# Patient Record
Sex: FEMALE | Race: WHITE | NOT HISPANIC OR LATINO | Employment: FULL TIME | URBAN - METROPOLITAN AREA
[De-identification: names, ages, dates, MRNs, and addresses within clinical notes are randomized per-mention and may not be internally consistent; named-entity substitution may affect disease eponyms.]

---

## 2020-01-31 ENCOUNTER — OFFICE VISIT (OUTPATIENT)
Dept: URGENT CARE | Facility: CLINIC | Age: 35
End: 2020-01-31
Payer: COMMERCIAL

## 2020-01-31 VITALS
HEART RATE: 69 BPM | DIASTOLIC BLOOD PRESSURE: 55 MMHG | RESPIRATION RATE: 18 BRPM | BODY MASS INDEX: 23.08 KG/M2 | TEMPERATURE: 97.4 F | SYSTOLIC BLOOD PRESSURE: 111 MMHG | OXYGEN SATURATION: 98 % | HEIGHT: 62 IN | WEIGHT: 125.4 LBS

## 2020-01-31 DIAGNOSIS — J06.9 ACUTE URI: Primary | ICD-10-CM

## 2020-01-31 PROCEDURE — G0383 LEV 4 HOSP TYPE B ED VISIT: HCPCS | Performed by: PHYSICIAN ASSISTANT

## 2020-01-31 NOTE — PROGRESS NOTES
Assessment/Plan    Acute URI [J06 9]  1  Acute URI           Subjective:     Patient ID: Azeb Cabral is a 37 y o  female  Reason For Visit / Chief Complaint  Chief Complaint   Patient presents with    Sore Throat     Symptoms began approximately a week ago    Cough         12-year-old female presents the clinic with nasal congestion, rhinorrhea, sore throat, cough that started approximately 1 week ago  Patient has not tried over-the-counter medications  Patient denies fevers, chills, nausea, vomiting, abdominal pain, chest tightness, wheezing, shortness of breath, headaches, lightheadedness, dizziness  History reviewed  No pertinent past medical history  History reviewed  No pertinent surgical history  History reviewed  No pertinent family history  Review of Systems   Constitutional: Negative for chills and fever  HENT: Positive for congestion, rhinorrhea and sore throat  Respiratory: Positive for cough  Gastrointestinal: Negative for abdominal pain, diarrhea, nausea and vomiting  Musculoskeletal: Negative for myalgias  Neurological: Negative for dizziness, light-headedness and headaches  Objective:    /55   Pulse 69   Temp (!) 97 4 °F (36 3 °C) (Tympanic)   Resp 18   Ht 5' 2" (1 575 m)   Wt 56 9 kg (125 lb 6 4 oz)   SpO2 98%   BMI 22 94 kg/m²     Physical Exam   Constitutional: She is oriented to person, place, and time  She appears well-developed and well-nourished  No distress  HENT:   Head: Normocephalic and atraumatic  Right Ear: Tympanic membrane normal    Left Ear: Tympanic membrane normal    Nose: Mucosal edema and rhinorrhea present  Right sinus exhibits no maxillary sinus tenderness and no frontal sinus tenderness  Left sinus exhibits no maxillary sinus tenderness and no frontal sinus tenderness  Mouth/Throat: Uvula is midline and mucous membranes are normal  Posterior oropharyngeal erythema (PND) present     Cardiovascular: Normal rate and regular rhythm  Pulmonary/Chest: Effort normal and breath sounds normal    Musculoskeletal: Normal range of motion  Neurological: She is alert and oriented to person, place, and time  Skin: Skin is warm and dry  No rash noted  She is not diaphoretic  Nursing note and vitals reviewed

## 2020-01-31 NOTE — PATIENT INSTRUCTIONS
Upper Respiratory Infection   AMBULATORY CARE:   An upper respiratory infection  is also called a common cold  It can affect your nose, throat, ears, and sinuses  Common signs and symptoms include the following:  Cold symptoms are usually worst for the first 3 to 5 days  You may have any of the following:  · Runny or stuffy nose    · Sneezing and coughing    · Sore throat or hoarseness    · Red, watery, and sore eyes    · Fatigue     · Chills and fever    · Headache, body aches, or sore muscles  Seek care immediately if:   · You have chest pain or trouble breathing  Contact your healthcare provider if:   · You have a fever over 102ºF (39°C)  · Your sore throat gets worse or you see white or yellow spots in your throat  · Your symptoms get worse after 3 to 5 days or your cold is not better in 14 days  · You have a rash anywhere on your skin  · You have large, tender lumps in your neck  · You have thick, green or yellow drainage from your nose  · You cough up thick yellow, green, or bloody mucus  · You have vomiting for more than 24 hours and cannot keep fluids down  · You have a bad earache  · You have questions or concerns about your condition or care  Treatment for a cold: There is no cure for the common cold  Colds are caused by viruses and do not get better with antibiotics  Most people get better in 7 to 14 days  You may continue to cough for 2 to 3 weeks  The following may help decrease your symptoms:  · Decongestants  help reduce nasal congestion and help you breathe more easily  If you take decongestant pills, they may make you feel restless or not able to sleep  Do not use decongestant sprays for more than a few days  · Cough suppressants  help reduce coughing  Ask your healthcare provider which type of cough medicine is best for you  · NSAIDs , such as ibuprofen, help decrease swelling, pain, and fever   NSAIDs can cause stomach bleeding or kidney problems in certain people  If you take blood thinner medicine, always ask your healthcare provider if NSAIDs are safe for you  Always read the medicine label and follow directions  · Acetaminophen  decreases pain and fever  It is available without a doctor's order  Ask how much to take and how often to take it  Follow directions  Read the labels of all other medicines you are using to see if they also contain acetaminophen, or ask your doctor or pharmacist  Acetaminophen can cause liver damage if not taken correctly  Do not use more than 4 grams (4,000 milligrams) total of acetaminophen in one day  Manage your cold:   · Rest as much as possible  Slowly start to do more each day  · Drink more liquids as directed  Liquids will help thin and loosen mucus so you can cough it up  Liquids will also help prevent dehydration  Liquids that help prevent dehydration include water, fruit juice, and broth  Do not drink liquids that contain caffeine  Caffeine can increase your risk for dehydration  Ask your healthcare provider how much liquid to drink each day  · Soothe a sore throat  Gargle with warm salt water  This helps your sore throat feel better  Make salt water by dissolving ¼ teaspoon salt in 1 cup warm water  You may also suck on hard candy or throat lozenges  You may use a sore throat spray  · Use a humidifier or vaporizer  Use a cool mist humidifier or a vaporizer to increase air moisture in your home  This may make it easier for you to breathe and help decrease your cough  · Use saline nasal drops as directed  These help relieve congestion  · Apply petroleum-based jelly around the outside of your nostrils  This can decrease irritation from blowing your nose  · Do not smoke  Nicotine and other chemicals in cigarettes and cigars can make your symptoms worse  They can also cause infections such as bronchitis or pneumonia   Ask your healthcare provider for information if you currently smoke and need help to quit  E-cigarettes or smokeless tobacco still contain nicotine  Talk to your healthcare provider before you use these products  Prevent spreading your cold to others:   · Try to stay away from other people during the first 2 to 3 days of your cold when it is more easily spread  · Do not share food or drinks  · Do not share hand towels with household members  · Wash your hands often, especially after you blow your nose  Turn away from other people and cover your mouth and nose with a tissue when you sneeze or cough  Follow up with your healthcare provider as directed:  Write down your questions so you remember to ask them during your visits  © 2017 [facility] [facility] Information is for End User's use only and may not be sold, redistributed or otherwise used for commercial purposes  All illustrations and images included in CareNotes® are the copyrighted property of [employer]  or Uval[employer]  The above information is an  only  It is not intended as medical advice for individual conditions or treatments  Talk to your doctor, nurse or pharmacist before following any medical regimen to see if it is safe and effective for you

## 2021-10-02 ENCOUNTER — HOSPITAL ENCOUNTER (EMERGENCY)
Facility: HOSPITAL | Age: 36
Discharge: HOME | End: 2021-10-02
Attending: EMERGENCY MEDICINE | Admitting: EMERGENCY MEDICINE
Payer: COMMERCIAL

## 2021-10-02 VITALS
WEIGHT: 126.9 LBS | HEART RATE: 69 BPM | RESPIRATION RATE: 16 BRPM | OXYGEN SATURATION: 100 % | TEMPERATURE: 97.6 F | DIASTOLIC BLOOD PRESSURE: 90 MMHG | SYSTOLIC BLOOD PRESSURE: 174 MMHG | BODY MASS INDEX: 23.21 KG/M2

## 2021-10-02 DIAGNOSIS — J06.9 VIRAL URI WITH COUGH: Primary | ICD-10-CM

## 2021-10-02 LAB — SARS-COV-2 RNA RESP QL NAA+PROBE: NEGATIVE

## 2021-10-02 PROCEDURE — U0005 INFEC AGEN DETEC AMPLI PROBE: HCPCS | Performed by: EMERGENCY MEDICINE

## 2021-10-02 PROCEDURE — 99282 EMERGENCY DEPT VISIT SF MDM: CPT | Performed by: EMERGENCY MEDICINE

## 2021-10-02 PROCEDURE — U0003 INFECTIOUS AGENT DETECTION BY NUCLEIC ACID (DNA OR RNA); SEVERE ACUTE RESPIRATORY SYNDROME CORONAVIRUS 2 (SARS-COV-2) (CORONAVIRUS DISEASE [COVID-19]), AMPLIFIED PROBE TECHNIQUE, MAKING USE OF HIGH THROUGHPUT TECHNOLOGIES AS DESCRIBED BY CMS-2020-01-R: HCPCS | Performed by: EMERGENCY MEDICINE

## 2021-10-02 PROCEDURE — 99283 EMERGENCY DEPT VISIT LOW MDM: CPT

## 2021-10-02 RX ORDER — FLUTICASONE PROPIONATE 50 MCG
1 SPRAY, SUSPENSION (ML) NASAL DAILY
Qty: 16 G | Refills: 0 | Status: SHIPPED | OUTPATIENT
Start: 2021-10-02

## 2021-10-02 RX ORDER — PSEUDOEPHEDRINE HYDROCHLORIDE 30 MG/1
30 TABLET ORAL EVERY 6 HOURS PRN
Qty: 20 TABLET | Refills: 0 | Status: SHIPPED | OUTPATIENT
Start: 2021-10-02

## 2021-10-03 ENCOUNTER — TELEPHONE (OUTPATIENT)
Dept: EMERGENCY DEPT | Facility: HOSPITAL | Age: 36
End: 2021-10-03

## 2022-10-01 ENCOUNTER — OFFICE VISIT (OUTPATIENT)
Dept: URGENT CARE | Facility: CLINIC | Age: 37
End: 2022-10-01
Payer: COMMERCIAL

## 2022-10-01 VITALS
BODY MASS INDEX: 24.1 KG/M2 | DIASTOLIC BLOOD PRESSURE: 72 MMHG | WEIGHT: 136 LBS | SYSTOLIC BLOOD PRESSURE: 119 MMHG | OXYGEN SATURATION: 99 % | TEMPERATURE: 99.1 F | HEIGHT: 63 IN | RESPIRATION RATE: 18 BRPM | HEART RATE: 63 BPM

## 2022-10-01 DIAGNOSIS — S05.02XA ABRASION OF LEFT CORNEA, INITIAL ENCOUNTER: Primary | ICD-10-CM

## 2022-10-01 PROCEDURE — G0382 LEV 3 HOSP TYPE B ED VISIT: HCPCS | Performed by: PREVENTIVE MEDICINE

## 2022-10-01 RX ORDER — BUSPIRONE HYDROCHLORIDE 5 MG/1
5 TABLET ORAL 2 TIMES DAILY
COMMUNITY
Start: 2022-09-04

## 2022-10-01 RX ORDER — SODIUM FLUORIDE 1.1 G/100G
GEL, DENTIFRICE ORAL
COMMUNITY
Start: 2022-08-02

## 2022-10-01 RX ORDER — EPINEPHRINE 0.3 MG/.3ML
INJECTION SUBCUTANEOUS
COMMUNITY
Start: 2022-04-29

## 2022-10-01 RX ORDER — GENTAMICIN SULFATE 3 MG/ML
1 SOLUTION/ DROPS OPHTHALMIC EVERY 4 HOURS
Qty: 5 ML | Refills: 0 | Status: SHIPPED | OUTPATIENT
Start: 2022-10-01

## 2022-10-01 RX ORDER — TETRACAINE HYDROCHLORIDE 5 MG/ML
2 SOLUTION OPHTHALMIC ONCE
Status: COMPLETED | OUTPATIENT
Start: 2022-10-01 | End: 2022-10-01

## 2022-10-01 RX ADMIN — TETRACAINE HYDROCHLORIDE 2 DROP: 5 SOLUTION OPHTHALMIC at 16:10

## 2022-10-01 NOTE — PROGRESS NOTES
[facility]        NAME: Bri Thomas is a 42 y o  female  : 1985    MRN: [id number]  DATE: 2022  TIME: 4:26 PM    Assessment and Plan   Abrasion of left cornea, initial encounter [S05  02XA]  1  Abrasion of left cornea, initial encounter  fluorescein sodium sterile 1 mg ophthalmic strip 1 strip    tetracaine 0 5 % ophthalmic solution 2 drop         Patient Instructions       Follow up with PCP in 3-5 days  Proceed to  ER if symptoms worsen  Chief Complaint     Chief Complaint   Patient presents with    eye irritation     Pt was hit in r eye by pet dog earlier today-eye is now painful/difficulty opening it earlier-feels like their may be foreign body in eye: actuity 20/25 in lrighteye w/ color wdl uncorrected-same in left eye         History of Present Illness       Hit in the right initially could not even open the eyelid  Now that she is sitting in the exam room she feels no pain or irritation  Review of Systems   Review of Systems   Eyes: Positive for pain  Current Medications       Current Outpatient Medications:     busPIRone (BUSPAR) 5 mg tablet, Take 5 mg by mouth 2 (two) times a day, Disp: , Rfl:     Denta 5000 Plus 1 1 % CREA, USE TWICE DAILY  DO NOT RINSE , Disp: , Rfl:     EPINEPHrine (EPIPEN) 0 3 mg/0 3 mL SOAJ, , Disp: , Rfl:     fluticasone (FLONASE) 50 mcg/act nasal spray, 1 spray into each nostril daily, Disp: 16 g, Rfl: 0    pseudoephedrine (SUDAFED) 30 mg tablet, Take 1 tablet (30 mg total) by mouth every 6 (six) hours as needed for congestion, Disp: 20 tablet, Rfl: 0  No current facility-administered medications for this visit      Current Allergies     Allergies as of 10/01/2022 - Reviewed 10/01/2022   Allergen Reaction Noted    Dust mite extract Allergic Rhinitis 10/18/2019    Tree extract Rash 10/18/2019            The following portions of the patient's history were reviewed and updated as appropriate: allergies, current medications, past family history, past medical history, past social history, past surgical history and problem list      History reviewed  No pertinent past medical history  History reviewed  No pertinent surgical history  History reviewed  No pertinent family history  Medications have been verified  Objective   /72   Pulse 63   Temp 99 1 °F (37 3 °C)   Resp 18   Ht 5' 3" (1 6 m)   Wt 61 7 kg (136 lb)   SpO2 99%   BMI 24 09 kg/m²   No LMP recorded  Physical Exam     Physical Exam  Eyes:      General:         Right eye: No discharge  Left eye: No discharge  Conjunctiva/sclera: Conjunctivae normal       Pupils: Pupils are equal, round, and reactive to light  Comments: No obvious lesions on the sclera the conjunctiva pupils are the iris    Note she refuse tetracaine and fluorescein dye

## 2023-07-07 ENCOUNTER — OFFICE VISIT (OUTPATIENT)
Dept: OBGYN CLINIC | Facility: CLINIC | Age: 38
End: 2023-07-07
Payer: COMMERCIAL

## 2023-07-07 ENCOUNTER — APPOINTMENT (OUTPATIENT)
Dept: RADIOLOGY | Facility: CLINIC | Age: 38
End: 2023-07-07
Payer: COMMERCIAL

## 2023-07-07 VITALS
DIASTOLIC BLOOD PRESSURE: 79 MMHG | WEIGHT: 130 LBS | HEIGHT: 63 IN | HEART RATE: 70 BPM | SYSTOLIC BLOOD PRESSURE: 148 MMHG | BODY MASS INDEX: 23.04 KG/M2

## 2023-07-07 DIAGNOSIS — M25.562 LEFT KNEE PAIN, UNSPECIFIED CHRONICITY: ICD-10-CM

## 2023-07-07 DIAGNOSIS — M25.452 EFFUSION OF LEFT HIP: Primary | ICD-10-CM

## 2023-07-07 DIAGNOSIS — M25.552 LEFT HIP PAIN: ICD-10-CM

## 2023-07-07 PROCEDURE — 99203 OFFICE O/P NEW LOW 30 MIN: CPT | Performed by: STUDENT IN AN ORGANIZED HEALTH CARE EDUCATION/TRAINING PROGRAM

## 2023-07-07 PROCEDURE — 73502 X-RAY EXAM HIP UNI 2-3 VIEWS: CPT

## 2023-07-07 NOTE — PROGRESS NOTES
Hip New Office Note    Assessment:     1. Left hip pain        Plan:  Findings today are consistent with left hip pain, osteoarthritis and erosion of femoral neck. Imaging and prognosis was reviewed with the patient today. We discussed that her previous MRI from 2021 shows a large joint effusion. She has been worked up for both infection and rheumatologic processes which have been negative. She has moderate cartilage loss on her MRI with erosions of the femoral neck. Discussed that we will order a repeat MRI of the hip with and without contrast to assure that there is no persistent synovial enhancement to suggest neooplastic or infectious etiology. She will f/u after her MRI. Problem List Items Addressed This Visit    None  Visit Diagnoses     Left hip pain    -  Primary    Relevant Orders    XR hip/pelv 2-3 vws left if performed    MRI hip left w wo contrast         Subjective:     Patient ID: Christopher Sheldon is a 34 y.o. female. Patient seen in consultation at the request of Dr. Patricia El DO    Chief Complaint:  HPI:   The patient presents with a chief complaint of left hip pain. The pain began 4 year(s) ago and is not associated with an acute injury. She does note that she was a  and notes that is the hip she would slide on. The patient describes the pain as aching and sharp and 5 out of 10 in intensity. It is constant in timing, and localizes the pain to the groin and posterior hip. The pain is worse with sitting cross legged, bending to put on shoe, running, laying on the left side and prolonged walking and relieved with rest, medication: Ibuprofen used and beneficial, avoiding painful activities. She denies mechanical symptoms such as locking and catching. She reports instability of the hip  Patient has tired IA cortisone injection with minimal relief June 2022. An aspiration of the hip was also attempted, but fluid was too thick.  Patient was previously treating with  [facility] at [facility]. She is here for second opinion. Allergy:  Allergies   Allergen Reactions   • Dust Mite Extract Allergic Rhinitis   • Tree Extract Rash     Medications:  all current active meds have been reviewed  Past Medical History:  History reviewed. No pertinent past medical history. Past Surgical History:  History reviewed. No pertinent surgical history. Family History:  History reviewed. No pertinent family history. Social History:  Social History     Substance and Sexual Activity   Alcohol Use Yes    Comment: socially     Social History     Substance and Sexual Activity   Drug Use Never     Social History     Tobacco Use   Smoking Status Never   Smokeless Tobacco Never           ROS:  General: Per HPI  Skin: Negative, except if noted below  HEENT: Negative  Respiratory: Negative  Cardiovascular: Negative  Gastrointestinal: Negative  Urinary: Negative  Vascular: Negative  Musculoskeletal: Positive per HPI   Neurologic: Positive per HPI  Endocrine: Negative    Objective:  BP Readings from Last 1 Encounters:   07/07/23 148/79      Wt Readings from Last 1 Encounters:   07/07/23 59 kg (130 lb)        Respiratory:   non-labored respirations    Lymphatics:  no palpable lymph nodes    Gait and Station:   Antalgic     Neurologic:   Alert and oriented times 3  Patient with normal sensation except as noted below  Deep tendon reflexes 2+ except as noted in MSK exam    Bilateral Lower Extremity:  Left Hip     Inspection: skin intact     Range of Motion: limited IR, pain w/ internal rotation    + log roll    - Trendelenburg sign    Motor: 5/5 Q/HS/TA/GS/P    Pulses: 2+ DP / 2+ PT    SILT DP/SP/S/S/TN    Imaging:  My interpretation XR AP pelvis/ left hip: moderate joint space narrowing, subchondral sclerosis, subchondral cysts, osteophyte formation. No fracture or dislocation. Erosion of femoral neck. MRI left hip 11/2021: large effusion, erosion of inferior femoral neck. Moderate cartilage loss.      BMI: Estimated body mass index is 23.03 kg/m² as calculated from the following:    Height as of this encounter: 5' 3" (1.6 m). Weight as of this encounter: 59 kg (130 lb). BSA:   Estimated body surface area is 1.61 meters squared as calculated from the following:    Height as of this encounter: 5' 3" (1.6 m). Weight as of this encounter: 59 kg (130 lb).            Scribe Attestation    I,:  Rudolph Christy am acting as a scribe while in the presence of the attending physician.:       I,:  Concepcion Malagon, DO personally performed the services described in this documentation    as scribed in my presence.:

## 2023-07-20 ENCOUNTER — OFFICE VISIT (OUTPATIENT)
Dept: URGENT CARE | Facility: CLINIC | Age: 38
End: 2023-07-20
Payer: COMMERCIAL

## 2023-07-20 VITALS
WEIGHT: 131 LBS | BODY MASS INDEX: 23.21 KG/M2 | RESPIRATION RATE: 16 BRPM | HEIGHT: 63 IN | OXYGEN SATURATION: 99 % | SYSTOLIC BLOOD PRESSURE: 128 MMHG | HEART RATE: 77 BPM | TEMPERATURE: 97.2 F | DIASTOLIC BLOOD PRESSURE: 64 MMHG

## 2023-07-20 DIAGNOSIS — R39.15 URINARY URGENCY: Primary | ICD-10-CM

## 2023-07-20 LAB
SL AMB  POCT GLUCOSE, UA: NEGATIVE
SL AMB LEUKOCYTE ESTERASE,UA: NEGATIVE
SL AMB POCT BILIRUBIN,UA: NEGATIVE
SL AMB POCT BLOOD,UA: ABNORMAL
SL AMB POCT CLARITY,UA: ABNORMAL
SL AMB POCT COLOR,UA: ABNORMAL
SL AMB POCT KETONES,UA: NEGATIVE
SL AMB POCT NITRITE,UA: NEGATIVE
SL AMB POCT PH,UA: 5
SL AMB POCT SPECIFIC GRAVITY,UA: 1.03
SL AMB POCT URINE PROTEIN: 30
SL AMB POCT UROBILINOGEN: 0.2

## 2023-07-20 PROCEDURE — 81002 URINALYSIS NONAUTO W/O SCOPE: CPT | Performed by: PHYSICIAN ASSISTANT

## 2023-07-20 PROCEDURE — G0382 LEV 3 HOSP TYPE B ED VISIT: HCPCS | Performed by: PHYSICIAN ASSISTANT

## 2023-07-20 PROCEDURE — 87086 URINE CULTURE/COLONY COUNT: CPT | Performed by: PHYSICIAN ASSISTANT

## 2023-07-20 NOTE — PATIENT INSTRUCTIONS
Patient was educated on staying hydrated. Patient was told we will send urine for culture. If symptoms persist follow up with PCP or urology. Urinary Urgency and Frequency   AMBULATORY CARE:   Urinary urgency and frequency  is a condition that increases how strongly or how often you need to urinate. The condition may also be called urgency-frequency syndrome. Urinary urgency means you feel such a strong need to urinate that you have trouble waiting. You may also feel discomfort in your bladder. Urinary frequency means you need to urinate many times during the day. This may also be called increased daytime frequency. You may be woken from sleep by the need to urinate. Urgency and frequency often happen together, but you may only have one. Contact your healthcare provider if:   Your urine is pink, or you notice blood in your urine. You have pain with urination. You continue to have symptoms even after you take your medicine. You have new or worsening symptoms. You have questions or concerns about your condition or care. Treatment  will depend on the type and cause of your urination problems. You may need any of the following:  Medicines  may be given to relax your bladder and decrease urination. You may also need antibiotics if your symptoms are caused by a bacterial infection. Sacral nerve stimulation  sends electrical signals to your sacral nerve through a small device implanted under your skin. Your sacral nerve controls your bladder, sphincter, and pelvic floor muscles. Botox injections  into your bladder may help relax your bladder muscle to decrease urgency and frequency. Surgery  may be done if all other treatments cannot help you control your bladder. Manage urinary urgency and frequency:   Keep a record of your urination patterns for a few days. Write down the number of times you urinate over 24 hours, the amount, and if you have urine leakage.  Record how strong the urge to urinate was each time. Your healthcare provider may also want you to record the type and amount of liquids you drink. Train your bladder. Go to the bathroom at set times, such as every 2 hours, even if you do not feel the urge to go. You can also try to hold your urine when you feel the urge to go. For example, hold your urine for 5 minutes when you feel the urge to go. As that becomes easier, hold your urine for 10 minutes. Work up to every 3 or 4 hours to help control your bladder. Limit liquids as directed. Limit liquids to decrease the amount you urinate. Ask how much liquid to drink each day and which liquids are best for you. You may need to avoid drinking liquids several hours before you go to sleep. Your healthcare provider may also recommend that you limit caffeine and alcohol. Do Kegel exercises often. Kegel exercises help strengthen your pelvic muscles and improve bladder control. These muscles help you stop urinating. Squeeze these muscles tightly for 5 seconds like you are trying to stop the flow of urine. Then relax for 5 seconds. Gradually work up to squeezing for 10 seconds. Do 3 sets of 15 repetitions a day, or as directed. Exercise regularly and maintain a healthy weight. Ask your healthcare provider how much you should weigh and about the best exercise plan for you. Extra weight puts pressure on your bladder and may make your symptoms worse. Ask your provider to help you create a safe weight loss plan if you are overweight. Follow up with your healthcare provider as directed: Your healthcare provider may refer you to a specialist to find the cause of your symptoms. Write down your questions so you remember to ask them during your visits. © Copyright [employer] 2022 Information is for End User's use only and may not be sold, redistributed or otherwise used for commercial purposes. The above information is an  only.  It is not intended as medical advice for individual conditions or treatments. Talk to your doctor, nurse or pharmacist before following any medical regimen to see if it is safe and effective for you.

## 2023-07-20 NOTE — PROGRESS NOTES
[facility]        NAME: Jody Ackerman is a 34 y.o. female  : 1985    MRN: [id number]  DATE: 2023  TIME: 8:55 AM    Assessment and Plan   Urinary urgency [R39.15]  1. Urinary urgency  POCT urine dip    Urine culture    Ambulatory Referral to Urology        Urinalysis- large blood and protein. Will send for culture. Patient Instructions     Patient was educated on staying hydrated. Patient was told we will send urine for culture. If symptoms persist follow up with PCP or urology. Chief Complaint     Chief Complaint   Patient presents with   • Possible UTI     PT presents with pelvic pressure and urinary frequency, as well as blood tinged urine x 1 week. History of Present Illness       Patient is here today complaining of blood in urine and urinary urgency for a few days. Patient reports she did have random abdomen pain last week but this subsided. Denies any allergies to medications. Denies any chances of Kidney stones. Patient reports she is getting her Kidneys currently evaluated. Denies any chance of pregnancy. Review of Systems   Review of Systems   Constitutional: Negative. Respiratory: Negative. Cardiovascular: Negative. Genitourinary: Positive for frequency, hematuria and urgency. Negative for dysuria. Psychiatric/Behavioral: Negative. Current Medications       Current Outpatient Medications:   •  busPIRone (BUSPAR) 5 mg tablet, Take 5 mg by mouth 2 (two) times a day, Disp: , Rfl:   •  Denta 5000 Plus 1.1 % CREA, USE TWICE DAILY.  DO NOT RINSE., Disp: , Rfl:   •  EPINEPHrine (EPIPEN) 0.3 mg/0.3 mL SOAJ, , Disp: , Rfl:   •  fluticasone (FLONASE) 50 mcg/act nasal spray, 1 spray into each nostril daily (Patient not taking: Reported on 2023), Disp: 16 g, Rfl: 0  •  gentamicin (GARAMYCIN) 0.3 % ophthalmic solution, Administer 1 drop to the right eye every 4 (four) hours (Patient not taking: Reported on 2023), Disp: 5 mL, Rfl: 0  • pseudoephedrine (SUDAFED) 30 mg tablet, Take 1 tablet (30 mg total) by mouth every 6 (six) hours as needed for congestion (Patient not taking: Reported on 7/7/2023), Disp: 20 tablet, Rfl: 0    Current Allergies     Allergies as of 07/20/2023 - Reviewed 07/20/2023   Allergen Reaction Noted   • Dust mite extract Allergic Rhinitis 10/18/2019   • Tree extract Rash 10/18/2019            The following portions of the patient's history were reviewed and updated as appropriate: allergies, current medications, past family history, past medical history, past social history, past surgical history and problem list.     History reviewed. No pertinent past medical history. History reviewed. No pertinent surgical history. History reviewed. No pertinent family history. Medications have been verified. Objective   /64   Pulse 77   Temp (!) 97.2 °F (36.2 °C)   Resp 16   Ht 5' 3" (1.6 m)   Wt 59.4 kg (131 lb)   SpO2 99%   BMI 23.21 kg/m²   No LMP recorded. Physical Exam     Physical Exam  Vitals and nursing note reviewed. Constitutional:       Appearance: Normal appearance. HENT:      Head: Normocephalic. Cardiovascular:      Rate and Rhythm: Normal rate and regular rhythm. Heart sounds: Normal heart sounds. Pulmonary:      Breath sounds: Normal breath sounds. Abdominal:      Palpations: Abdomen is soft. Neurological:      General: No focal deficit present. Mental Status: She is alert and oriented to person, place, and time.    Psychiatric:         Mood and Affect: Mood normal.         Behavior: Behavior normal.

## 2023-07-21 LAB — BACTERIA UR CULT: NORMAL

## 2023-08-01 ENCOUNTER — HOSPITAL ENCOUNTER (OUTPATIENT)
Dept: MRI IMAGING | Facility: HOSPITAL | Age: 38
Discharge: HOME | End: 2023-08-01
Attending: STUDENT IN AN ORGANIZED HEALTH CARE EDUCATION/TRAINING PROGRAM
Payer: COMMERCIAL

## 2023-08-01 DIAGNOSIS — M25.552 LEFT HIP PAIN: ICD-10-CM

## 2023-08-01 PROCEDURE — A9585 GADOBUTROL INJECTION: HCPCS | Performed by: STUDENT IN AN ORGANIZED HEALTH CARE EDUCATION/TRAINING PROGRAM

## 2023-08-01 PROCEDURE — G1004 CDSM NDSC: HCPCS

## 2023-08-01 PROCEDURE — 73723 MRI JOINT LWR EXTR W/O&W/DYE: CPT

## 2023-08-01 RX ADMIN — GADOBUTROL 5 ML: 604.72 INJECTION INTRAVENOUS at 13:45

## 2023-08-04 ENCOUNTER — NURSE TRIAGE (OUTPATIENT)
Dept: OBGYN CLINIC | Facility: CLINIC | Age: 38
End: 2023-08-04

## 2023-08-04 NOTE — TELEPHONE ENCOUNTER
Regarding: SIGNIFICANT FINDINGS  ----- Message from [facility] sent at 8/4/2023 10:22 AM EDT -----  LEFT HIP    1. Large left hip joint effusion with diffuse synovial proliferation and enhancement, cortical erosion of the femoral neck and mild bone marrow edema, similar to prior MRI dated November 18, 2021. Findings are most in keeping with inflammatory arthritis. Correlation with laboratory findings/joint fluid analysis is recommended.     2. 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 Gynecology consult is recommended. Unchanged from previous MRI.

## 2023-08-11 ENCOUNTER — OFFICE VISIT (OUTPATIENT)
Dept: OBGYN CLINIC | Facility: CLINIC | Age: 38
End: 2023-08-11
Payer: COMMERCIAL

## 2023-08-11 VITALS
HEIGHT: 63 IN | WEIGHT: 131 LBS | DIASTOLIC BLOOD PRESSURE: 76 MMHG | SYSTOLIC BLOOD PRESSURE: 124 MMHG | HEART RATE: 68 BPM | BODY MASS INDEX: 23.21 KG/M2

## 2023-08-11 DIAGNOSIS — M25.552 LEFT HIP PAIN: ICD-10-CM

## 2023-08-11 DIAGNOSIS — M19.90 INFLAMMATORY ARTHROPATHY: Primary | ICD-10-CM

## 2023-08-11 DIAGNOSIS — M25.452 EFFUSION OF LEFT HIP: ICD-10-CM

## 2023-08-11 PROCEDURE — 99214 OFFICE O/P EST MOD 30 MIN: CPT | Performed by: STUDENT IN AN ORGANIZED HEALTH CARE EDUCATION/TRAINING PROGRAM

## 2023-08-11 RX ORDER — MELOXICAM 7.5 MG/1
7.5 TABLET ORAL DAILY
Qty: 60 TABLET | Refills: 1 | Status: SHIPPED | OUTPATIENT
Start: 2023-08-11

## 2023-08-11 NOTE — PROGRESS NOTES
Hip Follow up Office Note    Assessment:     1. Left hip pain    2. Inflammatory arthropathy of left hip    3. Effusion of left hip        Plan:  33 y/o female with left hip pain due to osteoarthritis and erosion of femoral neck due to synovial inflammation and proliferation consistent with inflammatory arthropathy without formal diagnosis. Imaging and prognosis was reviewed with the patient today. We reviewed her new MRI and compared to her MRI from 2021. MRI shows no significant changes to the moderate cartilage loss on her MRI with erosions of the femoral neck with synovial inflammation/proliferation, and fluid production. No signs of oncologic process and discussed with orthopedic oncology partner who is in agreement. She has been worked up for both infection and inflammatory arthropathy which have been negative. Discussed treatment options of watching and waiting vs anterior CINDI. Mobic prescribed today for pain and inflammation. We will see her back in 3-6 months with repeat xrays, sooner if significant increase in pain. She has already discussed the dermoid cyst with gynecology and is having it removed next week. Problem List Items Addressed This Visit    None  Visit Diagnoses     Left hip pain    -  Primary    Relevant Medications    meloxicam (Mobic) 7.5 mg tablet    Inflammatory arthropathy of left hip        Effusion of left hip        Relevant Medications    meloxicam (Mobic) 7.5 mg tablet         Subjective:     Patient ID: Agnes Boles is a 34 y.o. female. Chief Complaint:  HPI:  Patient is a 33 y/o female who presents today for a follow up evaluation of her LEFT hip. The pain began 4 year(s) ago and is not associated with an acute injury. She does note that she was a  and notes that is the hip she would slide on. The patient describes the pain as aching and sharp and 5 out of 10 in intensity.   It is constant in timing, and localizes the pain to the groin and posterior hip. The pain is worse with sitting cross legged, bending to put on shoe, running, laying on the left side and prolonged walking and relieved with rest, medication: Ibuprofen used and beneficial, avoiding painful activities. She denies mechanical symptoms such as locking and catching. She reports instability of the hip  Patient has tired IA cortisone injection with minimal relief June 2022. An aspiration of the hip was also attempted, but fluid was too thick. Patient was previously treating with Dr. Erum Husain at [facility]. She has been worked up for inflammatory arthropathy and infection which have been negative for infection. At last visit we discussed that she has osteoarthritis and erosion of femoral neck noted on her xrays, and pain with range of motion of the hip. An MRI of the left hip was ordered to assure that there is no persistent synovial enhancement to suggest neooplastic or infectious etiology. She is here today to discuss results. Allergy:  Allergies   Allergen Reactions   • Dust Mite Extract Allergic Rhinitis   • Tree Extract Rash     Medications:  all current active meds have been reviewed  Past Medical History:  History reviewed. No pertinent past medical history. Past Surgical History:  History reviewed. No pertinent surgical history. Family History:  History reviewed. No pertinent family history.   Social History:  Social History     Substance and Sexual Activity   Alcohol Use Yes    Comment: socially     Social History     Substance and Sexual Activity   Drug Use Never     Social History     Tobacco Use   Smoking Status Never   Smokeless Tobacco Never           ROS:  General: Per HPI  Skin: Negative, except if noted below  HEENT: Negative  Respiratory: Negative  Cardiovascular: Negative  Gastrointestinal: Negative  Urinary: Negative  Vascular: Negative  Musculoskeletal: Positive per HPI   Neurologic: Positive per HPI  Endocrine: Negative    Objective:  BP Readings from Last 1 Encounters:   08/11/23 124/76      Wt Readings from Last 1 Encounters:   08/11/23 59.4 kg (131 lb)        Respiratory:   non-labored respirations    Lymphatics:  no palpable lymph nodes    Gait and Station:   Antalgic     Neurologic:   Alert and oriented times 3  Patient with normal sensation except as noted below  Deep tendon reflexes 2+ except as noted in MSK exam    Bilateral Lower Extremity:  Left Hip     Inspection: skin intact     Range of Motion: limited IR, pain w/ internal rotation    + log roll    - Trendelenburg sign    Motor: 5/5 Q/HS/TA/GS/P    Pulses: 2+ DP / 2+ PT    SILT DP/SP/S/S/TN    Imaging:  MRI:   1. Large left hip joint effusion with diffuse synovial proliferation and enhancement, cortical erosion of the femoral neck and mild bone marrow edema, similar to prior MRI dated November 18, 2021. Findings are most in keeping with inflammatory arthritis. Correlation with laboratory findings/joint fluid analysis is recommended.     2. 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 Gynecology consult is recommended. BMI:   Estimated body mass index is 23.21 kg/m² as calculated from the following:    Height as of this encounter: 5' 3" (1.6 m). Weight as of this encounter: 59.4 kg (131 lb). BSA:   Estimated body surface area is 1.62 meters squared as calculated from the following:    Height as of this encounter: 5' 3" (1.6 m). Weight as of this encounter: 59.4 kg (131 lb).            Scribe Attestation    I,:  Derrick Harvey PA-C am acting as a scribe while in the presence of the attending physician.:       I,:  Neo Mcarthur DO personally performed the services described in this documentation    as scribed in my presence.:

## 2023-12-09 DIAGNOSIS — M25.552 LEFT HIP PAIN: ICD-10-CM

## 2023-12-09 DIAGNOSIS — M25.452 EFFUSION OF LEFT HIP: ICD-10-CM

## 2023-12-11 RX ORDER — MELOXICAM 7.5 MG/1
7.5 TABLET ORAL DAILY
Qty: 60 TABLET | Refills: 0 | Status: SHIPPED | OUTPATIENT
Start: 2023-12-11

## 2024-01-12 ENCOUNTER — APPOINTMENT (OUTPATIENT)
Dept: RADIOLOGY | Facility: CLINIC | Age: 39
End: 2024-01-12
Payer: COMMERCIAL

## 2024-01-12 ENCOUNTER — OFFICE VISIT (OUTPATIENT)
Dept: OBGYN CLINIC | Facility: CLINIC | Age: 39
End: 2024-01-12
Payer: COMMERCIAL

## 2024-01-12 VITALS
BODY MASS INDEX: 23.21 KG/M2 | DIASTOLIC BLOOD PRESSURE: 87 MMHG | WEIGHT: 131 LBS | HEIGHT: 63 IN | SYSTOLIC BLOOD PRESSURE: 144 MMHG | HEART RATE: 71 BPM

## 2024-01-12 DIAGNOSIS — M25.552 LEFT HIP PAIN: ICD-10-CM

## 2024-01-12 DIAGNOSIS — M16.12 PRIMARY OSTEOARTHRITIS OF ONE HIP, LEFT: ICD-10-CM

## 2024-01-12 DIAGNOSIS — M13.852 OTHER SPECIFIED ARTHRITIS, LEFT HIP: Primary | ICD-10-CM

## 2024-01-12 DIAGNOSIS — M19.90 INFLAMMATORY ARTHROPATHY: ICD-10-CM

## 2024-01-12 PROCEDURE — 73502 X-RAY EXAM HIP UNI 2-3 VIEWS: CPT

## 2024-01-12 PROCEDURE — 99214 OFFICE O/P EST MOD 30 MIN: CPT | Performed by: STUDENT IN AN ORGANIZED HEALTH CARE EDUCATION/TRAINING PROGRAM

## 2024-01-12 RX ORDER — ASCORBIC ACID 500 MG
500 TABLET ORAL 2 TIMES DAILY
Qty: 30 TABLET | Refills: 3 | Status: SHIPPED | OUTPATIENT
Start: 2024-01-12

## 2024-01-12 RX ORDER — MELATONIN
2000 DAILY
Qty: 60 TABLET | Refills: 1 | Status: SHIPPED | OUTPATIENT
Start: 2024-01-12

## 2024-01-12 RX ORDER — CHLORHEXIDINE GLUCONATE ORAL RINSE 1.2 MG/ML
15 SOLUTION DENTAL ONCE
OUTPATIENT
Start: 2024-01-12 | End: 2024-01-12

## 2024-01-12 RX ORDER — MULTIVIT-MIN/IRON FUM/FOLIC AC 7.5 MG-4
1 TABLET ORAL DAILY
Qty: 30 TABLET | Refills: 3 | Status: SHIPPED | OUTPATIENT
Start: 2024-01-12

## 2024-01-12 RX ORDER — ACETAMINOPHEN 325 MG/1
975 TABLET ORAL ONCE
OUTPATIENT
Start: 2024-01-12 | End: 2024-01-12

## 2024-01-12 RX ORDER — CHLORHEXIDINE GLUCONATE 4 G/100ML
SOLUTION TOPICAL DAILY PRN
OUTPATIENT
Start: 2024-01-12

## 2024-01-12 RX ORDER — FOLIC ACID 1 MG/1
1 TABLET ORAL DAILY
Qty: 30 TABLET | Refills: 3 | Status: SHIPPED | OUTPATIENT
Start: 2024-01-12

## 2024-01-12 RX ORDER — SODIUM CHLORIDE, SODIUM LACTATE, POTASSIUM CHLORIDE, CALCIUM CHLORIDE 600; 310; 30; 20 MG/100ML; MG/100ML; MG/100ML; MG/100ML
125 INJECTION, SOLUTION INTRAVENOUS CONTINUOUS
OUTPATIENT
Start: 2024-01-12

## 2024-01-12 NOTE — PROGRESS NOTES
Hip Follow up Office Note    Assessment:     1. Inflammatory arthropathy    2. Left hip pain        Plan:  37 y/o female with left hip pain due to osteoarthritis and erosion of femoral neck due to synovial inflammation and proliferation consistent with inflammatory arthropathy without formal diagnosis that has progressed over the past few years. Previous MRI shows moderate cartilage loss on her MRI with erosions of the femoral neck with synovial inflammation/proliferation, and fluid production without signs of infection or oncologic process.  She has increasing pain in the left hip with ambulation and a limp. On XR there is increasing erosions of her femoral neck with concern for future fracture without intervention as she now has increased mechanical pain. We discussed the surgical treatment option of Left CINDI including the procedure and recovery time after. We discussed that we will send tissue from her hip to pathology to hopefully better differentiate a diagnosis.     The patient has elected to proceed with left CINDI through the anterior approach. Risks and benefits of surgery to include but not limited to bleeding, infection, damage to surrounding structures, hardware failure, instability, fracture, dislocation, leg length inequality, need for further surgery, continued pain, stiffness, blood clots, stroke, heart attack, and LFCN numbness was discussed with the patient. Informed consent was signed today in the office. The patient has met with our surgical schedulers and our preoperative joint replacement pathway has been initiated. All questions were answered. Patient will follow-up 2 weeks post operatively.  Patient is a nonsmoker and appropriate BMI of 23.21.        Problem List Items Addressed This Visit    None  Visit Diagnoses       Inflammatory arthropathy    -  Primary    Relevant Orders    XR hip/pelv 2-3 vws left if performed    Left hip pain               Subjective:     Patient ID: Nneka Andres barajas  a 38 y.o. female.       Chief Complaint:  HPI:  Patient is a 39 y/o female who presents today for a follow up evaluation of her LEFT hip. She has osteoarthritis and erosion of femoral neck noted on her xrays, and pain with range of motion of the hip.  The pain began 4 year(s) ago and is not associated with an acute injury.  She continues with pain localized to the groin and posterior hip. The pain is worse with sitting cross legged, bending to put on shoe, running, laying on the left side and prolonged walking and relieved with rest, medication: Ibuprofen used and beneficial, avoiding painful activities.  She denies mechanical symptoms such as locking and catching.  She reports instability of the hip  Patient has tired IA cortisone injection with minimal relief June 2022. An aspiration of the hip was also attempted, but fluid was too thick. Patient was previously treating with [facility] Ambriz at [facility]. She has been worked up for inflammatory arthropathy and infection which have been negative.    At last visit we reviewed an MRI of the left hip showing moderate cartilage loss with erosions of the femoral neck with synovial inflammation/proliferation, and fluid production.    Today she states that her left hip pain is significantly worse and she ambulates with a limp.  She states that the pain has gotten bad enough that she would like to discuss hip replacement.    She had her dermoid cyst removed by surgical oncology.    Allergy:  Allergies   Allergen Reactions    Dust Mite Extract Allergic Rhinitis    Tree Extract Rash     Medications:  all current active meds have been reviewed  Past Medical History:  History reviewed. No pertinent past medical history.  Past Surgical History:  History reviewed. No pertinent surgical history.  Family History:  History reviewed. No pertinent family history.  Social History:  Social History     Substance and Sexual Activity   Alcohol Use Yes    Comment: socially     Social History  "    Substance and Sexual Activity   Drug Use Never     Social History     Tobacco Use   Smoking Status Never   Smokeless Tobacco Never           ROS:  General: Per HPI  Skin: Negative, except if noted below  HEENT: Negative  Respiratory: Negative  Cardiovascular: Negative  Gastrointestinal: Negative  Urinary: Negative  Vascular: Negative  Musculoskeletal: Positive per HPI   Neurologic: Positive per HPI  Endocrine: Negative    Objective:  BP Readings from Last 1 Encounters:   01/12/24 144/87      Wt Readings from Last 1 Encounters:   01/12/24 59.4 kg (131 lb)        Respiratory:   non-labored respirations    Lymphatics:  no palpable lymph nodes    Gait and Station:   Antalgic     Neurologic:   Alert and oriented times 3  Patient with normal sensation except as noted below  Deep tendon reflexes 2+ except as noted in MSK exam    Bilateral Lower Extremity:  Left Hip     Inspection: skin intact     Range of Motion: limited IR, pain w/ internal rotation    + log roll    - Trendelenburg sign    Motor: 5/5 Q/HS/TA/GS/P    Pulses: 2+ DP / 2+ PT    SILT DP/SP/S/S/TN    Imaging:  Imaging:  My interpretation XR AP pelvis/ left hip: moderate joint space narrowing, subchondral sclerosis, subchondral cysts, osteophyte formation. Erosions of the superior and inferior femoral neck with thinning of her femoral neck. No fracture or dislocation.       BMI:   Estimated body mass index is 23.21 kg/m² as calculated from the following:    Height as of this encounter: 5' 3\" (1.6 m).    Weight as of this encounter: 59.4 kg (131 lb).  BSA:   Estimated body surface area is 1.62 meters squared as calculated from the following:    Height as of this encounter: 5' 3\" (1.6 m).    Weight as of this encounter: 59.4 kg (131 lb).           Scribe Attestation      I,:  Fanta Murcia PA-C am acting as a scribe while in the presence of the attending physician.:       I,:  Gaudencio Jackson, DO personally performed the services described in this " documentation    as scribed in my presence.:

## 2024-01-16 ENCOUNTER — PREP FOR PROCEDURE (OUTPATIENT)
Dept: OBGYN CLINIC | Facility: CLINIC | Age: 39
End: 2024-01-16

## 2024-02-09 DIAGNOSIS — M16.12 PRIMARY OSTEOARTHRITIS OF ONE HIP, LEFT: ICD-10-CM

## 2024-02-09 DIAGNOSIS — M25.452 EFFUSION OF LEFT HIP: ICD-10-CM

## 2024-02-09 DIAGNOSIS — M25.552 LEFT HIP PAIN: ICD-10-CM

## 2024-02-09 DIAGNOSIS — M19.90 INFLAMMATORY ARTHROPATHY: ICD-10-CM

## 2024-02-09 RX ORDER — MELOXICAM 7.5 MG/1
7.5 TABLET ORAL DAILY
Qty: 60 TABLET | Refills: 2 | Status: SHIPPED | OUTPATIENT
Start: 2024-02-09

## 2024-02-09 RX ORDER — FOLIC ACID 1 MG/1
1000 TABLET ORAL DAILY
Qty: 90 TABLET | Refills: 1 | Status: SHIPPED | OUTPATIENT
Start: 2024-02-09

## 2024-02-14 ENCOUNTER — TELEPHONE (OUTPATIENT)
Dept: OBGYN CLINIC | Facility: HOSPITAL | Age: 39
End: 2024-02-14

## 2024-02-14 NOTE — TELEPHONE ENCOUNTER
Preoperative Elective Admission Assessment    Living Situation:    Who does pt live with: spouse  What kind of home: multi-level  How do they enter the home: garage  How many levels in home: 2   # of steps to enter home: 1  # of steps to second floor: 12  Are there handrails: Yes  Are there landings: Yes  Sleeping arrangement: first/entry floor  Where is Bathroom: entry level half bath  Where is the tub or shower: walk in and step in w/o grab bar or bench  Dogs or ther pets: 2 dogs     First Floor Setup:   Is there a bathroom: Yes  Where would pt sleep: couch and recliner     DME:  Borrowing RW from relative  We discussed clearing pathways in the home and making sure there is accessibly to use the walker, for example, removing throw rugs.      Patient's Current Level of Function: Ambulates: Independently and ADLs: Independent    Post-op Caregiver: spouse  Caregiver Name and phone number for Inpatient discharge needs: Lj  Currently receive any HHC/aides/community supports: No     Post-op Transport: spouse  To/from hospital: spouse  To/from PT 2-3x/week: spouse  Uses community transport now: No     Outpatient Physical Therapy Site:  Site: [facility]  pre and post-op appts scheduled? Yes     Medication Management: self and pillbox  Preferred Pharmacy for Post-op Medications: [facility]  Blood Management Vitamin Regimen: Pt confirms taking as prescribed  Post-op anticoagulant: to be determined by surgical team postoperatively     DC Plan: Pt plans to be discharged home    Barriers to DC identified preoperatively: none identified    BMI: 23.21    Patient Education:  Pt educated on post-op pain, early mobilization (POD0), LOS goals, OP PT goals, and preoperative bathing. Patient educated that our goal is to appropriately discharge patient based off their post-op function while striving to maintain maximal independence. The goal is to discharge patient to home and for them to attend outpatient physical  therapy.    Assigned to care team? Yes   If you are a smoker, it is important for your health to stop smoking. Please be aware that second hand smoke is also harmful.

## 2024-02-22 ENCOUNTER — APPOINTMENT (OUTPATIENT)
Dept: LAB | Facility: CLINIC | Age: 39
End: 2024-02-22
Payer: COMMERCIAL

## 2024-02-22 DIAGNOSIS — M19.90 INFLAMMATORY ARTHROPATHY: ICD-10-CM

## 2024-02-22 DIAGNOSIS — M16.12 PRIMARY OSTEOARTHRITIS OF ONE HIP, LEFT: ICD-10-CM

## 2024-02-22 DIAGNOSIS — M16.12 PRIMARY OSTEOARTHRITIS OF LEFT HIP: ICD-10-CM

## 2024-02-22 DIAGNOSIS — M19.90 SENILE ARTHRITIS: ICD-10-CM

## 2024-02-22 LAB
ALBUMIN SERPL BCP-MCNC: 5.2 G/DL (ref 3.5–5)
ALP SERPL-CCNC: 44 U/L (ref 34–104)
ALT SERPL W P-5'-P-CCNC: 19 U/L (ref 7–52)
ANION GAP SERPL CALCULATED.3IONS-SCNC: 9 MMOL/L
APTT PPP: 29 SECONDS (ref 23–37)
AST SERPL W P-5'-P-CCNC: 22 U/L (ref 13–39)
ATRIAL RATE: 64 BPM
BASOPHILS # BLD AUTO: 0.03 THOUSANDS/ÂΜL (ref 0–0.1)
BASOPHILS NFR BLD AUTO: 0 % (ref 0–1)
BILIRUB SERPL-MCNC: 2.34 MG/DL (ref 0.2–1)
BUN SERPL-MCNC: 22 MG/DL (ref 5–25)
CALCIUM SERPL-MCNC: 10.3 MG/DL (ref 8.4–10.2)
CHLORIDE SERPL-SCNC: 100 MMOL/L (ref 96–108)
CO2 SERPL-SCNC: 26 MMOL/L (ref 21–32)
CREAT SERPL-MCNC: 0.78 MG/DL (ref 0.6–1.3)
EOSINOPHIL # BLD AUTO: 0.11 THOUSAND/ÂΜL (ref 0–0.61)
EOSINOPHIL NFR BLD AUTO: 2 % (ref 0–6)
ERYTHROCYTE [DISTWIDTH] IN BLOOD BY AUTOMATED COUNT: 13.6 % (ref 11.6–15.1)
EST. AVERAGE GLUCOSE BLD GHB EST-MCNC: 100 MG/DL
GFR SERPL CREATININE-BSD FRML MDRD: 95 ML/MIN/1.73SQ M
GLUCOSE P FAST SERPL-MCNC: 81 MG/DL (ref 65–99)
HBA1C MFR BLD: 5.1 %
HCT VFR BLD AUTO: 42.3 % (ref 34.8–46.1)
HGB BLD-MCNC: 14.3 G/DL (ref 11.5–15.4)
IMM GRANULOCYTES # BLD AUTO: 0.01 THOUSAND/UL (ref 0–0.2)
IMM GRANULOCYTES NFR BLD AUTO: 0 % (ref 0–2)
INR PPP: 1.11 (ref 0.84–1.19)
LYMPHOCYTES # BLD AUTO: 1.61 THOUSANDS/ÂΜL (ref 0.6–4.47)
LYMPHOCYTES NFR BLD AUTO: 23 % (ref 14–44)
MCH RBC QN AUTO: 28.7 PG (ref 26.8–34.3)
MCHC RBC AUTO-ENTMCNC: 33.8 G/DL (ref 31.4–37.4)
MCV RBC AUTO: 85 FL (ref 82–98)
MONOCYTES # BLD AUTO: 0.46 THOUSAND/ÂΜL (ref 0.17–1.22)
MONOCYTES NFR BLD AUTO: 7 % (ref 4–12)
NEUTROPHILS # BLD AUTO: 4.67 THOUSANDS/ÂΜL (ref 1.85–7.62)
NEUTS SEG NFR BLD AUTO: 68 % (ref 43–75)
NRBC BLD AUTO-RTO: 0 /100 WBCS
P AXIS: 58 DEGREES
PLATELET # BLD AUTO: 270 THOUSANDS/UL (ref 149–390)
PMV BLD AUTO: 11.6 FL (ref 8.9–12.7)
POTASSIUM SERPL-SCNC: 4.8 MMOL/L (ref 3.5–5.3)
PR INTERVAL: 180 MS
PROT SERPL-MCNC: 7.6 G/DL (ref 6.4–8.4)
PROTHROMBIN TIME: 14.2 SECONDS (ref 11.6–14.5)
QRS AXIS: 71 DEGREES
QRSD INTERVAL: 82 MS
QT INTERVAL: 392 MS
QTC INTERVAL: 404 MS
RBC # BLD AUTO: 4.99 MILLION/UL (ref 3.81–5.12)
SODIUM SERPL-SCNC: 135 MMOL/L (ref 135–147)
T WAVE AXIS: 46 DEGREES
VENTRICULAR RATE: 64 BPM
WBC # BLD AUTO: 6.89 THOUSAND/UL (ref 4.31–10.16)

## 2024-02-22 PROCEDURE — 85610 PROTHROMBIN TIME: CPT

## 2024-02-22 PROCEDURE — 36415 COLL VENOUS BLD VENIPUNCTURE: CPT

## 2024-02-22 PROCEDURE — 85730 THROMBOPLASTIN TIME PARTIAL: CPT

## 2024-02-22 PROCEDURE — 85025 COMPLETE CBC W/AUTO DIFF WBC: CPT

## 2024-02-22 PROCEDURE — 83036 HEMOGLOBIN GLYCOSYLATED A1C: CPT

## 2024-02-22 PROCEDURE — 80053 COMPREHEN METABOLIC PANEL: CPT

## 2024-02-23 ENCOUNTER — LAB REQUISITION (OUTPATIENT)
Dept: LAB | Facility: HOSPITAL | Age: 39
End: 2024-02-23
Payer: COMMERCIAL

## 2024-02-23 DIAGNOSIS — M16.12 UNILATERAL PRIMARY OSTEOARTHRITIS, LEFT HIP: ICD-10-CM

## 2024-02-23 DIAGNOSIS — M19.90 UNSPECIFIED OSTEOARTHRITIS, UNSPECIFIED SITE: ICD-10-CM

## 2024-02-23 LAB
ABO GROUP BLD: NORMAL
BLD GP AB SCN SERPL QL: NEGATIVE
RH BLD: POSITIVE
SPECIMEN EXPIRATION DATE: NORMAL

## 2024-02-23 PROCEDURE — 86850 RBC ANTIBODY SCREEN: CPT | Performed by: PHYSICIAN ASSISTANT

## 2024-02-23 PROCEDURE — 86900 BLOOD TYPING SEROLOGIC ABO: CPT | Performed by: PHYSICIAN ASSISTANT

## 2024-02-23 PROCEDURE — 86901 BLOOD TYPING SEROLOGIC RH(D): CPT | Performed by: PHYSICIAN ASSISTANT

## 2024-02-27 ENCOUNTER — ANESTHESIA EVENT (OUTPATIENT)
Age: 39
End: 2024-02-27
Payer: COMMERCIAL

## 2024-02-27 NOTE — PRE-PROCEDURE INSTRUCTIONS
Pre-Surgery Instructions:   Medication Instructions    ascorbic acid (VITAMIN C) 500 MG tablet Hold day of surgery.    busPIRone (BUSPAR) 5 mg tablet Take day of surgery.    cholecalciferol (VITAMIN D3) 1,000 units tablet Hold day of surgery.    folic acid (FOLVITE) 1 mg tablet Hold day of surgery.    meloxicam (MOBIC) 7.5 mg tablet Stop taking 3 days prior to surgery.    Multiple Vitamins-Minerals (multivitamin with minerals) tablet Hold day of surgery.   Medication instructions for day surgery reviewed. Please use only a sip of water to take your instructed medications. Avoid all over the counter vitamins, supplements and NSAIDS for one week prior to surgery per anesthesia guidelines. Tylenol is ok to take as needed.     You will receive a call one business day prior to surgery with an arrival time and hospital directions. If your surgery is scheduled on a Monday, the hospital will be calling you on the Friday prior to your surgery. If you have not heard from anyone by 8pm, please call the hospital supervisor through the hospital  at [phone]. ([place] [phone] or [place] [phone]).    Do not eat or drink anything after midnight the night before your surgery, including candy, mints, lifesavers, or chewing gum. Do not drink alcohol 24hrs before your surgery. Try not to smoke at least 24hrs before your surgery.       Follow the pre surgery showering instructions as listed in the “My Surgical Experience Booklet” or otherwise provided by your surgeon's office. Do not use a blade to shave the surgical area 1 week before surgery. It is okay to use a clean electric clippers up to 24 hours before surgery. Do not apply any lotions, creams, including makeup, cologne, deodorant, or perfumes after showering on the day of your surgery. Do not use dry shampoo, hair spray, hair gel, or any type of hair products.     No contact lenses, eye make-up, or artificial eyelashes. Remove nail polish, including gel  polish, and any artificial, gel, or acrylic nails if possible. Remove all jewelry including rings and body piercing jewelry.     Wear causal clothing that is easy to take on and off. Consider your type of surgery.    Keep any valuables, jewelry, piercings at home. Please bring any specially ordered equipment (sling, braces) if indicated.    Arrange for a responsible person to drive you to and from the hospital on the day of your surgery. Please confirm the visitor policy for the day of your procedure when you receive your phone call with an arrival time.     Call the surgeon's office with any new illnesses, exposures, or additional questions prior to surgery.    Please reference your “My Surgical Experience Booklet” for additional information to prepare for your upcoming surgery.

## 2024-03-05 ENCOUNTER — TELEPHONE (OUTPATIENT)
Dept: OBGYN CLINIC | Facility: CLINIC | Age: 39
End: 2024-03-05

## 2024-03-05 NOTE — TELEPHONE ENCOUNTER
Called Nneka to inform her PCP was faxed the form for review to fill out for Pre op and at this time nothing is needed on her end.

## 2024-03-06 ENCOUNTER — EVALUATION (OUTPATIENT)
Dept: PHYSICAL THERAPY | Facility: CLINIC | Age: 39
End: 2024-03-06
Payer: COMMERCIAL

## 2024-03-06 DIAGNOSIS — M16.12 PRIMARY OSTEOARTHRITIS OF ONE HIP, LEFT: ICD-10-CM

## 2024-03-06 DIAGNOSIS — M19.90 INFLAMMATORY ARTHROPATHY: ICD-10-CM

## 2024-03-06 PROCEDURE — 97161 PT EVAL LOW COMPLEX 20 MIN: CPT | Performed by: PHYSICAL THERAPIST

## 2024-03-06 NOTE — PROGRESS NOTES
PT Evaluation     Today's date: 3/6/2024  Patient name: Nneka Campos  : 1985  MRN: [id number]  Referring provider: Fanta Murcia PA-C  Dx:   Encounter Diagnosis     ICD-10-CM    1. Inflammatory arthropathy  M19.90 Ambulatory referral to Physical Therapy      2. Primary osteoarthritis of one hip, left  M16.12 Ambulatory referral to Physical Therapy                     Assessment  Assessment details: Pt is a 38 y.o. female who presents to outpatient PT pre-op CINDI with pain, decreased rom, decreased strength and decreased functional mobility. She will benefit from skilled PT to address these deficits in order to achieve her goals and maximize her functional mobility.  She has been provided with an initially hep to maximize hip rom and glute/quad strength and is scheduled for her first post-op apt on 3/27/24          Plan  Planned therapy interventions: abdominal trunk stabilization, manual therapy, massage, strengthening, stretching, therapeutic activities, therapeutic exercise, therapeutic training, transfer training, home exercise program, IADL retraining and gait training  Frequency: 2x week  Duration in weeks: 8        Subjective Evaluation    History of Present Illness  Mechanism of injury: Pt reports that she will be undergoing CINDI on 3/21/24. Reports that she has been having pain for approx 4 years but this has been progresivly getting worse. Reports disturbances at night from pain. 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      Patient Goals  Patient goals for therapy: decreased pain, increased motion, increased strength, independence with ADLs/IADLs, return to sport/leisure activities and return to work    Pain  Current pain ratin  At worst pain ratin          Objective    L hip:    Pre-op      ROM:    Flex: wfl  Abd: wfl  IR: 15 pain at end range  ER: 25, pain at end range      Strength:    Flex: 5-/5  Abd:  4+/5  Ir: 5/5  Er: 4/5  Ext:  4-/5      Single leg Squat:  valgus noted and pain produced             Precautions: CINDI      Manuals             prom                                                    Neuro Re-Ed                                                                                                        Ther Ex             Quad set             saq             Slr flexion             Cone taps             Ball squeeze             bridge             Mini squats             Step ups             Ther Activity                                       Gait Training                                       Modalities             Cp prn

## 2024-03-07 ENCOUNTER — ANESTHESIA EVENT (OUTPATIENT)
Age: 39
End: 2024-03-07

## 2024-03-07 ENCOUNTER — ANESTHESIA (OUTPATIENT)
Age: 39
End: 2024-03-07

## 2024-03-20 NOTE — DISCHARGE INSTR - AVS FIRST PAGE
Dr. Jakcson Hip Replacement    What to Expect/Activity  You are weight bearing as tolerated to your operative leg unless otherwise instructed. Use your walker or crutches. It is important to get up and walk for 10 minutes every hour, if possible.  Initial recovery therapy is focused on walking. If in your follow-up a referral to formal physical therapy is required, this will be provided based on your recovery.  You may sleep however you would like. Many patients feel more comfortable with a pillow between their legs and find it uncomfortably to lay on the operative side. If you do roll on that side at night you will not damage the replacement.  You may use a regular or elevated toilet seat, whichever is more comfortable.  We do not routinely require any specific precautions in terms of positioning of your leg and if so this will be taught to you by the physical therapists at the hospital. This means that you can dress as you are comfortable, including shoes and socks. You can bend down carefully to get items from the floor.   Swelling and discomfort causes pain. Elevate your surgical leg on 1-2 pillows placed behind your ankle/calf throughout the day, especially when you are laying down.  Please use ice packs for 20-30 minutes every 1-2 hours for 48-72 hours on the operative hip. Please make sure there is a barrier directly between your skin and your ice/ice pack.  Please use incentive spirometer 10 times per hour while awake (see diagram below).    Dressing/Wound Care/Bathing  There is a surgical dressing over your incision that stays in place until follow-up unless it starts to peel off.   You may start showering 24 hours after surgery, the surgical dressing will remain in place. Please pat the dressing dry. If you notice the dressing appears saturated or is starting to come off, please replace with a dry dressing.  Keep the dressing on until your follow-up in the office.   There may be dried blood around the  incision. It is ok to continue showering after removing the dressing but do not scrub the incision. Pat incision dry.  Do not place any creams, ointments or gels on or around the incision.  No baths, swimming or submerging until cleared by Dr. Jackson.    Pain Management/Medications  You may resume your usual medications.  Please take the following medications:  Anti-coagulation (blood clot prevention) - aspirin 81mg twice daily for 4 weeks  Pain medication:  Narcotic Medication: Take as directed  NSAID (Anti-inflammatory): Take as directed  Tylenol 1000mg every 8 hours  Zofran (ondasetron) - 4mg every 8 hours as needed for nausea  Stool Softeners (senna/colace)- take daily to help prevent constipation as narcotic pain medication causes constipation  Antibiotic - take as directed if prescribed  If you have questions or pain concerns, please contact the office. Pain medication cannot remove all post-operative pain.    Follow up/Call if:  The findings of your surgery will be explained to you and your family immediately after surgery. However, in the post-operative period, during recovery from anesthesia you may not fully remember or fully understand what was said. We will go over this again when you return for your post-op appointment.  Please contact Dr. Jackson's office if you experience the following:  Excessive bleeding (bleeding through your dressing)  Fever greater than 101 degrees F after the first 2 days (a slight fever is normal the first few days after surgery)  Persistent nausea or vomiting  Decreased sensation or discoloration of the operative limb  Pain or swelling that is getting worse and not better with medication    Dr. Jackson's Office Contact: [phone]

## 2024-03-21 ENCOUNTER — HOSPITAL ENCOUNTER (OUTPATIENT)
Age: 39
Setting detail: OUTPATIENT SURGERY
Discharge: HOME | End: 2024-03-21
Attending: STUDENT IN AN ORGANIZED HEALTH CARE EDUCATION/TRAINING PROGRAM | Admitting: STUDENT IN AN ORGANIZED HEALTH CARE EDUCATION/TRAINING PROGRAM
Payer: COMMERCIAL

## 2024-03-21 ENCOUNTER — ANESTHESIA (OUTPATIENT)
Age: 39
End: 2024-03-21
Payer: COMMERCIAL

## 2024-03-21 ENCOUNTER — APPOINTMENT (OUTPATIENT)
Age: 39
End: 2024-03-21
Payer: COMMERCIAL

## 2024-03-21 VITALS
BODY MASS INDEX: 21.44 KG/M2 | HEIGHT: 63 IN | HEART RATE: 78 BPM | TEMPERATURE: 99.2 F | RESPIRATION RATE: 7 BRPM | WEIGHT: 121 LBS | OXYGEN SATURATION: 100 % | SYSTOLIC BLOOD PRESSURE: 143 MMHG | DIASTOLIC BLOOD PRESSURE: 89 MMHG

## 2024-03-21 DIAGNOSIS — M19.90 INFLAMMATORY ARTHROPATHY: ICD-10-CM

## 2024-03-21 DIAGNOSIS — M25.552 LEFT HIP PAIN: ICD-10-CM

## 2024-03-21 DIAGNOSIS — M16.12 PRIMARY OSTEOARTHRITIS OF ONE HIP, LEFT: Primary | ICD-10-CM

## 2024-03-21 LAB
EXT PREGNANCY TEST URINE: NEGATIVE
EXT. CONTROL: NORMAL

## 2024-03-21 PROCEDURE — 97162 PT EVAL MOD COMPLEX 30 MIN: CPT | Performed by: PHYSICAL THERAPIST

## 2024-03-21 PROCEDURE — 87070 CULTURE OTHR SPECIMN AEROBIC: CPT | Performed by: STUDENT IN AN ORGANIZED HEALTH CARE EDUCATION/TRAINING PROGRAM

## 2024-03-21 PROCEDURE — 97530 THERAPEUTIC ACTIVITIES: CPT | Performed by: PHYSICAL THERAPIST

## 2024-03-21 PROCEDURE — 72170 X-RAY EXAM OF PELVIS: CPT

## 2024-03-21 PROCEDURE — C1776 JOINT DEVICE (IMPLANTABLE): HCPCS | Performed by: STUDENT IN AN ORGANIZED HEALTH CARE EDUCATION/TRAINING PROGRAM

## 2024-03-21 PROCEDURE — 88304 TISSUE EXAM BY PATHOLOGIST: CPT | Performed by: STUDENT IN AN ORGANIZED HEALTH CARE EDUCATION/TRAINING PROGRAM

## 2024-03-21 PROCEDURE — 81025 URINE PREGNANCY TEST: CPT | Performed by: STUDENT IN AN ORGANIZED HEALTH CARE EDUCATION/TRAINING PROGRAM

## 2024-03-21 PROCEDURE — 88311 DECALCIFY TISSUE: CPT | Performed by: STUDENT IN AN ORGANIZED HEALTH CARE EDUCATION/TRAINING PROGRAM

## 2024-03-21 PROCEDURE — 27130 TOTAL HIP ARTHROPLASTY: CPT | Performed by: STUDENT IN AN ORGANIZED HEALTH CARE EDUCATION/TRAINING PROGRAM

## 2024-03-21 PROCEDURE — 87102 FUNGUS ISOLATION CULTURE: CPT | Performed by: STUDENT IN AN ORGANIZED HEALTH CARE EDUCATION/TRAINING PROGRAM

## 2024-03-21 PROCEDURE — 87116 MYCOBACTERIA CULTURE: CPT | Performed by: STUDENT IN AN ORGANIZED HEALTH CARE EDUCATION/TRAINING PROGRAM

## 2024-03-21 PROCEDURE — 87075 CULTR BACTERIA EXCEPT BLOOD: CPT | Performed by: STUDENT IN AN ORGANIZED HEALTH CARE EDUCATION/TRAINING PROGRAM

## 2024-03-21 PROCEDURE — 87205 SMEAR GRAM STAIN: CPT | Performed by: STUDENT IN AN ORGANIZED HEALTH CARE EDUCATION/TRAINING PROGRAM

## 2024-03-21 PROCEDURE — 87176 TISSUE HOMOGENIZATION CULTR: CPT | Performed by: STUDENT IN AN ORGANIZED HEALTH CARE EDUCATION/TRAINING PROGRAM

## 2024-03-21 PROCEDURE — 73501 X-RAY EXAM HIP UNI 1 VIEW: CPT

## 2024-03-21 PROCEDURE — C1713 ANCHOR/SCREW BN/BN,TIS/BN: HCPCS | Performed by: STUDENT IN AN ORGANIZED HEALTH CARE EDUCATION/TRAINING PROGRAM

## 2024-03-21 PROCEDURE — 97535 SELF CARE MNGMENT TRAINING: CPT

## 2024-03-21 PROCEDURE — 97166 OT EVAL MOD COMPLEX 45 MIN: CPT

## 2024-03-21 PROCEDURE — 87206 SMEAR FLUORESCENT/ACID STAI: CPT | Performed by: STUDENT IN AN ORGANIZED HEALTH CARE EDUCATION/TRAINING PROGRAM

## 2024-03-21 DEVICE — PINNACLE CANCELLOUS BONE SCREW 6.5MM X 20MM
Type: IMPLANTABLE DEVICE | Status: FUNCTIONAL
Brand: PINNACLE

## 2024-03-21 DEVICE — BIOLOX DELTA CERAMIC FEMORAL HEAD 32MM DIA +1 12/14 TAPER
Type: IMPLANTABLE DEVICE | Site: HIP | Status: FUNCTIONAL
Brand: BIOLOX DELTA

## 2024-03-21 DEVICE — EMPHASYS POLYETHYLENE LINER AOX NEUTRAL 44MM 32MM
Type: IMPLANTABLE DEVICE | Site: HIP | Status: FUNCTIONAL
Brand: EMPHASYS

## 2024-03-21 DEVICE — ACTIS DUOFIX HIP PROSTHESIS (FEMORAL STEM 12/14 TAPER CEMENTLESS SIZE 2 HIGH COLLAR)  CE
Type: IMPLANTABLE DEVICE | Site: HIP | Status: FUNCTIONAL
Brand: ACTIS

## 2024-03-21 DEVICE — EMPHASYS ACETABULAR SHELL THREE-HOLE 44MM CEMENTLESS
Type: IMPLANTABLE DEVICE | Site: HIP | Status: FUNCTIONAL
Brand: EMPHASYS

## 2024-03-21 RX ORDER — FENTANYL CITRATE/PF 50 MCG/ML
50 SYRINGE (ML) INJECTION
Status: DISCONTINUED | OUTPATIENT
Start: 2024-03-21 | End: 2024-03-21 | Stop reason: HOSPADM

## 2024-03-21 RX ORDER — CEFAZOLIN SODIUM 2 G/50ML
2000 SOLUTION INTRAVENOUS ONCE
Status: COMPLETED | OUTPATIENT
Start: 2024-03-21 | End: 2024-03-21

## 2024-03-21 RX ORDER — DEXAMETHASONE SODIUM PHOSPHATE 10 MG/ML
INJECTION, SOLUTION INTRAMUSCULAR; INTRAVENOUS AS NEEDED
Status: DISCONTINUED | OUTPATIENT
Start: 2024-03-21 | End: 2024-03-21

## 2024-03-21 RX ORDER — TRANEXAMIC ACID 10 MG/ML
1000 INJECTION, SOLUTION INTRAVENOUS ONCE
Status: COMPLETED | OUTPATIENT
Start: 2024-03-21 | End: 2024-03-21

## 2024-03-21 RX ORDER — MIDAZOLAM HYDROCHLORIDE 2 MG/2ML
INJECTION, SOLUTION INTRAMUSCULAR; INTRAVENOUS AS NEEDED
Status: DISCONTINUED | OUTPATIENT
Start: 2024-03-21 | End: 2024-03-21

## 2024-03-21 RX ORDER — CALCIUM CARBONATE 500 MG/1
1000 TABLET, CHEWABLE ORAL DAILY PRN
Status: DISCONTINUED | OUTPATIENT
Start: 2024-03-21 | End: 2024-03-21 | Stop reason: HOSPADM

## 2024-03-21 RX ORDER — GABAPENTIN 300 MG/1
300 CAPSULE ORAL
Status: DISCONTINUED | OUTPATIENT
Start: 2024-03-21 | End: 2024-03-21 | Stop reason: HOSPADM

## 2024-03-21 RX ORDER — OXYCODONE HYDROCHLORIDE 10 MG/1
10 TABLET ORAL EVERY 4 HOURS PRN
Status: DISCONTINUED | OUTPATIENT
Start: 2024-03-21 | End: 2024-03-21 | Stop reason: HOSPADM

## 2024-03-21 RX ORDER — HYDROMORPHONE HCL/PF 1 MG/ML
0.5 SYRINGE (ML) INJECTION
Status: DISCONTINUED | OUTPATIENT
Start: 2024-03-21 | End: 2024-03-21 | Stop reason: HOSPADM

## 2024-03-21 RX ORDER — KETAMINE HCL IN NACL, ISO-OSM 100MG/10ML
SYRINGE (ML) INJECTION AS NEEDED
Status: DISCONTINUED | OUTPATIENT
Start: 2024-03-21 | End: 2024-03-21

## 2024-03-21 RX ORDER — AMOXICILLIN 250 MG
1 CAPSULE ORAL DAILY
Qty: 30 TABLET | Refills: 0 | Status: SHIPPED | OUTPATIENT
Start: 2024-03-21

## 2024-03-21 RX ORDER — SODIUM CHLORIDE, SODIUM LACTATE, POTASSIUM CHLORIDE, CALCIUM CHLORIDE 600; 310; 30; 20 MG/100ML; MG/100ML; MG/100ML; MG/100ML
100 INJECTION, SOLUTION INTRAVENOUS CONTINUOUS
Status: DISCONTINUED | OUTPATIENT
Start: 2024-03-21 | End: 2024-03-21 | Stop reason: HOSPADM

## 2024-03-21 RX ORDER — CHLORHEXIDINE GLUCONATE 4 G/100ML
SOLUTION TOPICAL DAILY PRN
Status: DISCONTINUED | OUTPATIENT
Start: 2024-03-21 | End: 2024-03-21 | Stop reason: HOSPADM

## 2024-03-21 RX ORDER — DOXYCYCLINE 100 MG/1
100 CAPSULE ORAL 2 TIMES DAILY
Qty: 28 CAPSULE | Refills: 0 | Status: SHIPPED | OUTPATIENT
Start: 2024-03-21 | End: 2024-04-04

## 2024-03-21 RX ORDER — ONDANSETRON 4 MG/1
4 TABLET, ORALLY DISINTEGRATING ORAL EVERY 6 HOURS PRN
Qty: 20 TABLET | Refills: 0 | Status: SHIPPED | OUTPATIENT
Start: 2024-03-21

## 2024-03-21 RX ORDER — PROPOFOL 10 MG/ML
INJECTION, EMULSION INTRAVENOUS CONTINUOUS PRN
Status: DISCONTINUED | OUTPATIENT
Start: 2024-03-21 | End: 2024-03-21

## 2024-03-21 RX ORDER — SUCCINYLCHOLINE/SOD CL,ISO/PF 100 MG/5ML
SYRINGE (ML) INTRAVENOUS AS NEEDED
Status: DISCONTINUED | OUTPATIENT
Start: 2024-03-21 | End: 2024-03-21

## 2024-03-21 RX ORDER — PROMETHAZINE HYDROCHLORIDE 25 MG/ML
12.5 INJECTION, SOLUTION INTRAMUSCULAR; INTRAVENOUS ONCE AS NEEDED
Status: DISCONTINUED | OUTPATIENT
Start: 2024-03-21 | End: 2024-03-21 | Stop reason: HOSPADM

## 2024-03-21 RX ORDER — CEFAZOLIN SODIUM 2 G/50ML
2000 SOLUTION INTRAVENOUS EVERY 8 HOURS
Status: DISCONTINUED | OUTPATIENT
Start: 2024-03-21 | End: 2024-03-21 | Stop reason: HOSPADM

## 2024-03-21 RX ORDER — ACETAMINOPHEN 500 MG
1000 TABLET ORAL EVERY 8 HOURS
Qty: 60 TABLET | Refills: 0 | Status: SHIPPED | OUTPATIENT
Start: 2024-03-21

## 2024-03-21 RX ORDER — OXYCODONE HYDROCHLORIDE 5 MG/1
5 TABLET ORAL EVERY 4 HOURS PRN
Qty: 42 TABLET | Refills: 0 | Status: SHIPPED | OUTPATIENT
Start: 2024-03-21 | End: 2024-03-31

## 2024-03-21 RX ORDER — HYDROMORPHONE HCL/PF 1 MG/ML
SYRINGE (ML) INJECTION AS NEEDED
Status: DISCONTINUED | OUTPATIENT
Start: 2024-03-21 | End: 2024-03-21

## 2024-03-21 RX ORDER — CHLORHEXIDINE GLUCONATE ORAL RINSE 1.2 MG/ML
15 SOLUTION DENTAL ONCE
Status: COMPLETED | OUTPATIENT
Start: 2024-03-21 | End: 2024-03-21

## 2024-03-21 RX ORDER — LIDOCAINE HYDROCHLORIDE 10 MG/ML
INJECTION, SOLUTION EPIDURAL; INFILTRATION; INTRACAUDAL; PERINEURAL AS NEEDED
Status: DISCONTINUED | OUTPATIENT
Start: 2024-03-21 | End: 2024-03-21

## 2024-03-21 RX ORDER — SENNOSIDES 8.6 MG
1 TABLET ORAL DAILY
Status: DISCONTINUED | OUTPATIENT
Start: 2024-03-21 | End: 2024-03-21 | Stop reason: HOSPADM

## 2024-03-21 RX ORDER — SODIUM CHLORIDE, SODIUM LACTATE, POTASSIUM CHLORIDE, CALCIUM CHLORIDE 600; 310; 30; 20 MG/100ML; MG/100ML; MG/100ML; MG/100ML
125 INJECTION, SOLUTION INTRAVENOUS CONTINUOUS
Status: DISCONTINUED | OUTPATIENT
Start: 2024-03-21 | End: 2024-03-21 | Stop reason: HOSPADM

## 2024-03-21 RX ORDER — ACETAMINOPHEN 325 MG/1
975 TABLET ORAL EVERY 8 HOURS
Status: DISCONTINUED | OUTPATIENT
Start: 2024-03-21 | End: 2024-03-21 | Stop reason: HOSPADM

## 2024-03-21 RX ORDER — ONDANSETRON 2 MG/ML
4 INJECTION INTRAMUSCULAR; INTRAVENOUS EVERY 6 HOURS PRN
Status: DISCONTINUED | OUTPATIENT
Start: 2024-03-21 | End: 2024-03-21 | Stop reason: HOSPADM

## 2024-03-21 RX ORDER — ONDANSETRON 2 MG/ML
INJECTION INTRAMUSCULAR; INTRAVENOUS AS NEEDED
Status: DISCONTINUED | OUTPATIENT
Start: 2024-03-21 | End: 2024-03-21

## 2024-03-21 RX ORDER — MAGNESIUM HYDROXIDE/ALUMINUM HYDROXICE/SIMETHICONE 120; 1200; 1200 MG/30ML; MG/30ML; MG/30ML
30 SUSPENSION ORAL EVERY 6 HOURS PRN
Status: DISCONTINUED | OUTPATIENT
Start: 2024-03-21 | End: 2024-03-21 | Stop reason: HOSPADM

## 2024-03-21 RX ORDER — ACETAMINOPHEN 325 MG/1
975 TABLET ORAL ONCE
Status: COMPLETED | OUTPATIENT
Start: 2024-03-21 | End: 2024-03-21

## 2024-03-21 RX ORDER — MAGNESIUM HYDROXIDE 1200 MG/15ML
LIQUID ORAL AS NEEDED
Status: DISCONTINUED | OUTPATIENT
Start: 2024-03-21 | End: 2024-03-21 | Stop reason: HOSPADM

## 2024-03-21 RX ORDER — OXYCODONE HYDROCHLORIDE 5 MG/1
5 TABLET ORAL EVERY 4 HOURS PRN
Status: DISCONTINUED | OUTPATIENT
Start: 2024-03-21 | End: 2024-03-21 | Stop reason: HOSPADM

## 2024-03-21 RX ORDER — CELECOXIB 200 MG/1
200 CAPSULE ORAL 2 TIMES DAILY
Qty: 60 CAPSULE | Refills: 0 | Status: SHIPPED | OUTPATIENT
Start: 2024-03-21

## 2024-03-21 RX ORDER — ONDANSETRON 2 MG/ML
4 INJECTION INTRAMUSCULAR; INTRAVENOUS ONCE AS NEEDED
Status: DISCONTINUED | OUTPATIENT
Start: 2024-03-21 | End: 2024-03-21 | Stop reason: HOSPADM

## 2024-03-21 RX ORDER — DOCUSATE SODIUM 100 MG/1
100 CAPSULE, LIQUID FILLED ORAL 2 TIMES DAILY
Status: DISCONTINUED | OUTPATIENT
Start: 2024-03-21 | End: 2024-03-21 | Stop reason: HOSPADM

## 2024-03-21 RX ADMIN — MEPIVACAINE HYDROCHLORIDE 3.5 ML: 15 INJECTION, SOLUTION EPIDURAL; INFILTRATION at 07:35

## 2024-03-21 RX ADMIN — CEFAZOLIN SODIUM 2000 MG: 2 SOLUTION INTRAVENOUS at 07:27

## 2024-03-21 RX ADMIN — CHLORHEXIDINE GLUCONATE 15 ML: 1.2 SOLUTION ORAL at 06:28

## 2024-03-21 RX ADMIN — FENTANYL CITRATE 50 MCG: 50 INJECTION INTRAMUSCULAR; INTRAVENOUS at 09:53

## 2024-03-21 RX ADMIN — LIDOCAINE HYDROCHLORIDE 50 MG: 10 INJECTION, SOLUTION EPIDURAL; INFILTRATION; INTRACAUDAL; PERINEURAL at 07:37

## 2024-03-21 RX ADMIN — DEXAMETHASONE SODIUM PHOSPHATE 10 MG: 10 INJECTION, SOLUTION INTRAMUSCULAR; INTRAVENOUS at 07:50

## 2024-03-21 RX ADMIN — Medication 100 MG: at 08:02

## 2024-03-21 RX ADMIN — PROPOFOL 120 MCG/KG/MIN: 10 INJECTION, EMULSION INTRAVENOUS at 07:37

## 2024-03-21 RX ADMIN — OXYCODONE HYDROCHLORIDE 5 MG: 5 TABLET ORAL at 11:37

## 2024-03-21 RX ADMIN — ONDANSETRON 4 MG: 2 INJECTION INTRAMUSCULAR; INTRAVENOUS at 07:27

## 2024-03-21 RX ADMIN — SODIUM CHLORIDE, SODIUM LACTATE, POTASSIUM CHLORIDE, AND CALCIUM CHLORIDE 125 ML/HR: .6; .31; .03; .02 INJECTION, SOLUTION INTRAVENOUS at 06:29

## 2024-03-21 RX ADMIN — TRANEXAMIC ACID 1000 MG: 10 INJECTION, SOLUTION INTRAVENOUS at 07:47

## 2024-03-21 RX ADMIN — PROPOFOL 150 MG: 10 INJECTION, EMULSION INTRAVENOUS at 08:02

## 2024-03-21 RX ADMIN — ACETAMINOPHEN 325MG 975 MG: 325 TABLET ORAL at 06:28

## 2024-03-21 RX ADMIN — SODIUM CHLORIDE, SODIUM LACTATE, POTASSIUM CHLORIDE, AND CALCIUM CHLORIDE: .6; .31; .03; .02 INJECTION, SOLUTION INTRAVENOUS at 08:50

## 2024-03-21 RX ADMIN — Medication 30 MG: at 08:07

## 2024-03-21 RX ADMIN — MIDAZOLAM 2 MG: 1 INJECTION INTRAMUSCULAR; INTRAVENOUS at 07:27

## 2024-03-21 RX ADMIN — HYDROMORPHONE HYDROCHLORIDE 0.5 MG: 1 INJECTION, SOLUTION INTRAMUSCULAR; INTRAVENOUS; SUBCUTANEOUS at 08:06

## 2024-03-21 NOTE — CASE MANAGEMENT
Case Management Discharge Planning Note    Patient name Nneka Campos  Location OR POOL/OR POOL MRN [id number]  : 1985 Date 3/21/2024       Current Admission Date: 3/21/2024  Current Admission Diagnosis:Primary osteoarthritis of left hip   Patient Active Problem List    Diagnosis Date Noted    Primary osteoarthritis of left hip 2024      LOS (days): 0  Geometric Mean LOS (GMLOS) (days):   Days to GMLOS:     OBJECTIVE:            Current admission status: Outpatient Surgery   Preferred Pharmacy:   [facility]/pharmacy #[id number] - [place] - [address] [address]  [address] [address]  Phone: [phone] Fax: [phone]    Primary Care Provider: Haley Patel DO    Primary Insurance: [employer]  Secondary Insurance:     DISCHARGE DETAILS:                                  Additional Comments:  spoke to physical therapist. Patient went home with no needs from case management. CM consult closed.

## 2024-03-21 NOTE — PLAN OF CARE
Problem: PHYSICAL THERAPY ADULT  Goal: Performs mobility at highest level of function for planned discharge setting.  See evaluation for individualized goals.  Description: Treatment/Interventions: Therapeutic exercise, Elevations, LE strengthening/ROM, Functional transfer training, Bed mobility, Gait training, Spoke to nursing, OT, Family  Equipment Recommended: Walker (pt owns)       See flowsheet documentation for full assessment, interventions and recommendations.  Note: Prognosis: Excellent  Problem List: Decreased strength, Decreased range of motion, Impaired balance, Decreased endurance, Decreased mobility, Pain, Orthopedic restrictions  Assessment: Pt is a 38 y.o. female who presented to [facility] on 3/21/2024 s/p L CINDI, anterior approach done by Dr. Jackson. Precautions include WBAT. Pt  has a past medical history of Heart murmur.. Pt greeted supine for PT evaluation on 03/21/24. Pt referred to PT for functional mobility evaluation & D/C planning w/ orders of activity beginning POD #0 and activity as tolerated. PTA, pt reports being I w/o AD and I w/ IADLs. Personal factors affecting pt at time of IE include: lives in 1 story house and stairs to enter home. Please find objective findings from PT assessment regarding body systems outlined above with impairments and limitations including weakness, decreased ROM, impaired balance, decreased endurance, pain, decreased activity tolerance, decreased functional mobility tolerance, fall risk, and orthopedic restrictions.  Please see flow sheet above for objective findings and level of assistance required for safe completion of functional tasks. Pt was able to perform supine to sit with S and use of bedrails. Pt able to perform sit<>stand with RW and S. Pt ambulated 40'x2 to the bathroom with S and RW. Pt needed cues for proper RW technique. Pt was able to perform 2 steps with RW and S. Vitals taken t/o session, please see flowsheet for objective data. Pt needed cues  for LE sequencing during stairs, but had good carryover. Please see treatment note for additional functional mobility following evaluation. Pt demonstrated dec endurance and tolerance to activity. Denies reports of dizziness or SOB t/o session. Patient was left  seated EOB with   with call bell and all needs within reach. Pt was educated on fall precautions and reinforced w/ good understanding. Based on pt presentation and impaired function, pt would benefit from level III, (minimal resource intensity) at D/C. From PT/mobility standpoint, pt would benefit from OPPT to address deficits as defined above and maximize level of independence and return pt to PLOF. HEP given and reviewed with patient, no questions at this time. CM to follow. Nsg staff to continue to mobilized pt (OOB in chair for all meals & ambulate in room/unit) as tolerated to prevent further decline in function. Nsg notified. Co-eval performed with OT to complete this evaluation for the pts best interest given pts medical complexity and functional level.  Barriers to Discharge: None     Rehab Resource Intensity Level, PT: III (Minimum Resource Intensity)    See flowsheet documentation for full assessment.

## 2024-03-21 NOTE — ANESTHESIA PROCEDURE NOTES
Spinal Block    Patient location during procedure: OR  Start time: 3/21/2024 7:35 AM  Reason for block: primary anesthetic  Staffing  Performed by: Denise Paiz CRNA  Authorized by: Juvenal Brewster MD    Preanesthetic Checklist  Completed: patient identified, IV checked, site marked, risks and benefits discussed, surgical consent, monitors and equipment checked, pre-op evaluation and timeout performed  Spinal Block  Patient position: sitting  Prep: ChloraPrep  Patient monitoring: heart rate, continuous pulse ox and frequent blood pressure checks  Approach: midline  Location: L3-4  Injection technique: single-shot  Needle  Needle type: pencil-tip   Needle gauge: 25 G  Needle length: 10 cm  Assessment  Sensory level: T4  Injection Assessment:  positive aspiration for clear CSF.  Post-procedure:  site cleaned

## 2024-03-21 NOTE — OP NOTE
OPERATIVE REPORT  PATIENT NAME: Nneka Campos  : 1985  MRN: [id number]  Pt Location:  [facility] OR ROOM 05    Surgery Date: 3/21/2024    Surgeons and Role:     * Gaudencio Jackson,  - Primary     * PRANAV WilcoxC - Assisting     * Shaheed Clarke MD - Observing      * Delmy Rousseau AT-C - Assisting     Preop Diagnosis:  Left hip pain [M25.552]  Inflammatory arthropathy [M19.90]  Primary osteoarthritis of one hip, left [M16.12]    Post-Op Diagnosis Codes:     * Left hip pain [M25.552]     * Inflammatory arthropathy [M19.90]     * Primary osteoarthritis of one hip, left [M16.12]    Procedure(s):  Left - ARTHROPLASTY HIP TOTAL ANTERIOR.NAVIGATED    Specimens:  ID Type Source Tests Collected by Time Destination   1 : LEFT FEMORAL HEAD Tissue Joint, Left Hip TISSUE EXAM Gaudencio Jackson, DO 3/21/2024 0814    A : LEFT HIP 1 Tissue Joint, Left Hip CULTURE, TISSUE AND GRAM STAIN, AFB CULTURE WITH STAIN Gaudencio Jackson, DO 3/21/2024 0813    B : LEFT HIP 2 Tissue Joint, Left Hip FUNGAL CULTURE, CULTURE, TISSUE AND GRAM STAIN Gaudencio Jackson, DO 3/21/2024 0813    C : LEFT HIP 3 Tissue Joint, Left Hip ANAEROBIC CULTURE AND GRAM STAIN, CULTURE, TISSUE AND GRAM STAIN Gaudencio Jackson, DO 3/21/2024 0814        Estimated Blood Loss:   50 mL    Drains:  * No LDAs found *    Anesthesia Type:   Spinal     Operative Indications:  Left hip pain [M25.552]  Inflammatory arthropathy [M19.90]  Primary osteoarthritis of one hip, left [M16.12]  39 y/o female with left hip pain due to osteoarthritis and erosion of femoral neck due to synovial inflammation and proliferation consistent with inflammatory arthropathy without formal diagnosis that has progressed over the past few years. Previous MRI shows moderate cartilage loss on her MRI with erosions of the femoral neck with synovial inflammation/proliferation, and fluid production without signs of infection or oncologic process.  She has increasing pain  in the left hip with ambulation and a limp. On XR there is increasing erosions of her femoral neck with concern for future fracture without intervention as she now has increased mechanical pain. The patient has elected to proceed with left CINDI through the anterior approach. Risks and benefits of surgery to include but not limited to bleeding, infection, damage to surrounding structures, hardware failure, instability, fracture, dislocation, leg length inequality, need for further surgery, continued pain, stiffness, blood clots, stroke, heart attack, and LFCN numbness was discussed with the patient. We will take intra-op cultures and send pathology.     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 Item Serial No.  Lot No. LRB No. Used Action   SCREW PRATIBHA 6.5 X 20MM SLF TAP PINNACLE - [id number]  SCREW PRATIBHA 6.5 X 20MM SLF TAP PINNACLE  DEPUY [id number] Left 1 Implanted   SCREW PRATIBHA 6.5 X 20MM SLF TAP PINNACLE - [id number]  SCREW PRATIBHA 6.5 X 20MM SLF TAP PINNACLE  DEPUY [id number] Left 1 Implanted   EMPHASYS POLYETHLYLENE LINER NEUTRAL AOX 44MM 32MM     [id number] Left 1 Implanted   EMPHASYS ACETABULAR SHELL THREE-HOLE 44MM CEMENTLESS     [id number] Left 1 Implanted   STEM FEM SZ 2 TAPER CMNTLS HI COLLAR ACTIS - [id number]  STEM FEM SZ 2 TAPER CMNTLS HI COLLAR ACTIS  DEPUY [id number] Left 1 Implanted   HEAD FEMORAL 12/14 TPR 32MM +1MM BIOLOX ARTICULEZE - [id number]  HEAD FEMORAL 12/14 TPR 32MM +1MM BIOLOX ARTICULEZE  DEPUY [id number] Left 1 Implanted         Complications:   None    Hip Approach: Direct anterior    Procedure and Technique:  The patient seen in the preoperative area. The informed consent was confirmed.  The operative site was confirmed and marked.  Patient was taken to the operating room and anesthesia was performed by the  anesthesiologist.  The patient's bilateral lower extremities were well padded and placed in the in the hana table boots.  Patient was secured to the operating room table and all bony prominences were well padded.  Patient was given perioperative antibiotics per scip protocol.  A formal time-out was performed identifying the patient and correct surgical site.  The left lower extremity was prepped and draped in usual sterile fashion.  A 10 cm incision was made anteriorly approximately 2 fingerbreadths lateral and 3 fingerbreadths distal to ASIS avoiding the hip flexion crease sharply with a 10 blade.  Electrocautery was used to cauterize skin bleeders.  This was carried down through subcutaneous tissues to the level of the tensor fascia crystal fascia.  Fascia was incised in line with the muscle fibers.  The tensor fascia crystal muscle was then moved laterally and the inferior fascia was incised.  The branches of the lateral femoral cutaneous vessles were coagulated with electrocautery.  Retractors were placed carefully around the femoral neck.  Pericapsular fat was removed.  The anterior capsule was removed in its entirety.  Retractors were then carefully placed around the femoral neck.  Femoral neck was resected in line with our template. Femoral head was removed with corkscrew.  Femoral head measured approximately 41 mm with erosion of the neck.  At this time retractors were carefully placed around the acetabulum.  The labrum was removed sharply. The inferior capsule was released.  Pulvinar was was removed to expose the medial wall of the acetabulum.  We started sequentially reaming at a size 39 medialized the medial wall.  We then sequentially reamed up to a size 44 with excellent chatter the Reamer.  We then impacted the Emphasys cluster hole 44 mm cup into place with the guidance of the [employer] hip navigation.  Our cup was placed at approximately 43° of abduction and 23 anteversion per navigation guidance. We drilled,  measured and placed 2 screws up the posterior column to augment fixation. Cup was well fixed and we placed the neutral Emphasys liner.  The liner was inspected and assured to be well seated.  At this time all the retractors were removed and attention was drawn to the femur.  Retractors were placed over the greater trochanter and along the medial calcar exposing the top of the femur. Medial capsule was released off the calcar and lateral capsule was resected allowing elevation of the femur into the wound.  We used the canal finer to localize the canal.  We then started broaching sequentially up to a size 2 Actis.  The size 2 had excellent rotational stability and canal fill.  We calcar planed down to the level of stem.  We trialed the hip with both std offset and high offset with a +1 trial head.  The hip had excellent stability to 30° of internal rotation and 90° of external rotation with restoration of leg length and offset with the high offset and +1 head.  This was confirmed with intraoperative navigation via the [employer] hip navigation platform.  The trial femoral stem was removed and the real size 2 high offset stem was impacted into place.  The +1 32 mm ceramic head was impacted onto a dry trunnion.  At this time the hip was lavaged with Irrisept solution.  Hip was then irrigated with remainder of 3 L of normal saline solution.  The deep tissues were again inspected for any bleeding from the lateral femoral cutaneous vessels or muscle bleeders.  Any bleeders were cauterized with electrocautery.  The fascia was then closed with interrupted 0 Vicryl followed by running #1 Stratafix suture.  The subcutaneous tissue was closed with interrupted 2-0 Vicryl.  The skin was then closed with running 4-0 Stratafix.  A Prineo dressing was placed sterilely.  The patient was awoken from anesthesia and taken to recovery room in stable condition.        Pre-surgical planning Xray  2.   Intraoperative use of [employer] Navigation  system  3.   Intraoperative use of fluoroscopy and saved images     I was present for the entire case incision to closure.  Fanta Murcia PA-C was necessary for positioning, retracting, and suturing.  No qualified resident was available for the case.     Patient Disposition:  PACU       38F s/p L CINDI 3/21  - multi-modal pain control   - hany-op abx x 24hrs, doxy x 2 weeks as cultures pending     - DVT ppx: asprin 81mg BID x 4 weeks  - PT/OT  - WBAT  - No formal hip precautions, no extremes of motion  - keep dressing in place until follow-up  - f/u I/O cultures  - f/u I/O pathology  - f/u 10-14 days      SIGNATURE: Gaudencio Jackson,   DATE: March 21, 2024  TIME: 9:03 AM

## 2024-03-21 NOTE — PLAN OF CARE
Problem: OCCUPATIONAL THERAPY ADULT  Goal: Performs self-care activities at highest level of function for planned discharge setting.  See evaluation for individualized goals.  Description: Treatment Interventions: ADL retraining, Functional transfer training, Endurance training, Patient/family training, Equipment evaluation/education, Compensatory technique education, Continued evaluation, Energy conservation, Activityengagement          See flowsheet documentation for full assessment, interventions and recommendations.   Note: Limitation: Decreased ADL status, Decreased cognition, Decreased self-care trans, Decreased high-level ADLs  Prognosis: Good  Assessment: Pt is a 38 y.o. female seen for OT evaluation s/p adm to  [facility]  on 3/21/2024 w/ Primary osteoarthritis of left hip . Comorbidities affecting pt’s functional performance include a significant PMH of heart murmur. Pt with active OT orders and activity orders for Activity beginning POD #0. WBAT LLE. Pt lives with spouse in 2Sh with 1 OLIVER through garage. Pt has 1/2 bath and couch/recliner on main level. Walkin shower on 2nd level. At baseline, pt was independent with all ADLs/IADLs. Pt completed supine to sit with supervision. Pt completed sit to stand with supervision. Pt completed LB dressing (pants and underwear) with supervision and proper technique. See below for additional treatment session focusing on LB dressing, functional mobility, transfers, and overall ADL/IADLs for independence at home. Upon evaluation, pt currently requires supervision for UB ADLs, supervision for LB ADLs, supervision for toileting, supervision for bed mobility, supervision for functional mobility, and supervision for transfers 2* the following deficits impacting occupational performance: weakness, decreased strength , decreased balance, decreased activity tolerance, increased pain, and orthopedic restrictions. These impairments, as well at pt’s personal factors of:  OLIVER home environment, steps within home environment, difficulty performing ADLs, difficulty performing IADLs, difficulty performing transfers/mobility, WBS, fall risk , and new use of AD for functional transfers/mobility limit pt’s ability to safely engage in all baseline areas of occupation. Based on the aforementioned OT evaluation, functional performance deficits, and assessments, pt has been identified as a moderate complexity evaluation. Pt to continue to benefit from continued acute OT services during hospital stay to address defined deficits and to maximize level of functional independence in the following Occupational Performance areas: eating, grooming, toilet hygiene, dressing, medication management, health maintenance, functional mobility, community mobility, clothing management, cleaning, household maintenance, and job performance/volunteering. From OT standpoint, recommend no post acute rehabilitation needs upon D/C. OT will continue to follow pt 3-5x/wk.     Rehab Resource Intensity Level, OT: No post-acute rehabilitation needs

## 2024-03-21 NOTE — OCCUPATIONAL THERAPY NOTE
Occupational Therapy Evaluation and Treatment     Patient Name: Nneka Campos  Today's Date: 3/21/2024  Problem List  Principal Problem:    Primary osteoarthritis of left hip    Past Medical History  Past Medical History:   Diagnosis Date    Heart murmur      Past Surgical History  Past Surgical History:   Procedure Laterality Date    OVARIAN CYST REMOVAL  11/2023    WISDOM TOOTH EXTRACTION          03/21/24 1236   OT Last Visit   OT Visit Date 03/21/24   Note Type   Note type Evaluation and Treatment   Additional Comments Pt greeted in supine, agreeable to OT evaluation.   Pain Assessment   Pain Assessment Tool 0-10   Pain Score 4   Pain Location/Orientation Orientation: Left;Location: Hip   Hospital Pain Intervention(s) Repositioned;Ambulation/increased activity;Elevated;Emotional support;Rest   Restrictions/Precautions   Weight Bearing Precautions Per Order Yes   LLE Weight Bearing Per Order WBAT   Other Precautions WBS;Multiple lines;Fall Risk;Pain   Home Living   Type of Home House   Home Layout Two level;1/2 bath on main level;Stairs to enter without rails;Bed/bath upstairs  (1 OLIVER)   Bathroom Shower/Tub Walk-in shower   Bathroom Toilet Standard   Bathroom Equipment Shower chair;Toilet raiser   Bathroom Accessibility Accessible   Home Equipment Walker;Quad cane;Crutches   Additional Comments (+)    Prior Function   Level of [place] Independent with ADLs;Independent with functional mobility;Independent with IADLS   Lives With Spouse;Other (Comment)  (2 children, 2 dogs)   Receives Help From Family   IADLs Independent with medication management;Independent with meal prep;Independent with driving   Falls in the last 6 months 0   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)   Comments (+)    Lifestyle   Autonomy Independent with all ADLs/IADLs, no AD use at baseline   Reciprocal Relationships Spouse Arnaldo   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 Pt completed don of R shoe and sock with supervision. Inc assistance to modA to don sock and shoe on LLE due to dec flexibility and pain.)   Toileting Assistance  5  Supervision/Setup   Toileting Deficit Verbal cueing;Supervison/safety  (Pt completed toileting with supervision and use of grab bars for stability. Pt used RW for stability during pants and underwear management.)   Bed Mobility   Supine to Sit 5  Supervision   Additional items Verbal cues;LE management   Additional Comments min verbal cues for hand placement and safety; VITALS: supine: 154/84, EOB: 138/84, Stand: 143/89   Transfers   Sit to Stand 5  Supervision   Additional items Increased time required;Verbal cues  (RW)   Stand to Sit 5  Supervision   Additional items Increased time required;Verbal cues  (RW)   Toilet transfer 5  Supervision   Additional items Verbal cues;Standard toilet;Increased time required  (grab bars and RW for stability)   Functional Mobility   Functional Mobility 5  Supervision   Additional Comments supervision functional household distances with RW   Additional items Rolling walker   Balance   Static Sitting Good   Dynamic Sitting Fair +   Static Standing Fair   Dynamic Standing Fair -   Ambulatory Fair -   Activity Tolerance   Activity Tolerance Patient tolerated treatment well   Medical Staff Made Aware PT Ali, Pt seen for co-evaluation with skilled Physical Therapy due to clinically unstable  presentation, medical complexity, fall risk, functional balance, limited activity tolerance which is a decline from PLOF and may impact overall safety.   Nurse Made Aware RN yes   RUE Assessment   RUE Assessment WFL   LUE Assessment   LUE Assessment WFL   Hand Function   Gross Motor Coordination Functional   Fine Motor Coordination Functional   Cognition   Overall Cognitive Status WFL   Arousal/Participation Alert;Cooperative   Attention Within functional limits   Orientation Level Oriented X4   Memory Within functional limits   Following Commands Follows one step commands without difficulty   Comments Pleasant and motivated   Assessment   Limitation Decreased ADL status;Decreased cognition;Decreased self-care trans;Decreased high-level ADLs   Prognosis Good   Assessment Pt is a 38 y.o. female seen for OT evaluation s/p adm to  [facility]  on 3/21/2024 w/ Primary osteoarthritis of left hip . Comorbidities affecting pt’s functional performance include a significant PMH of heart murmur. Pt with active OT orders and activity orders for Activity beginning POD #0. WBAT LLE. Pt lives with spouse in 2Sh with 1 OLIVER through garage. Pt has 1/2 bath and couch/recliner on main level. Walkin shower on 2nd level. At baseline, pt was independent with all ADLs/IADLs. Pt completed supine to sit with supervision. Pt completed sit to stand with supervision. Pt completed LB dressing (pants and underwear) with supervision and proper technique. See below for additional treatment session focusing on LB dressing, functional mobility, transfers, and overall ADL/IADLs for independence at home. Upon evaluation, pt currently requires supervision for UB ADLs, supervision for LB ADLs, supervision for toileting, supervision for bed mobility, supervision for functional mobility, and supervision for transfers 2* the following deficits impacting occupational performance: weakness, decreased strength , decreased balance, decreased activity  tolerance, increased pain, and orthopedic restrictions. These impairments, as well at pt’s personal factors of: OLIVER home environment, steps within home environment, difficulty performing ADLs, difficulty performing IADLs, difficulty performing transfers/mobility, WBS, fall risk , and new use of AD for functional transfers/mobility limit pt’s ability to safely engage in all baseline areas of occupation. Based on the aforementioned OT evaluation, functional performance deficits, and assessments, pt has been identified as a moderate complexity evaluation. Pt to continue to benefit from continued acute OT services during hospital stay to address defined deficits and to maximize level of functional independence in the following Occupational Performance areas: eating, grooming, toilet hygiene, dressing, medication management, health maintenance, functional mobility, community mobility, clothing management, cleaning, household maintenance, and job performance/volunteering. From OT standpoint, recommend no post acute rehabilitation needs upon D/C. OT will continue to follow pt 3-5x/wk.   Goals   STG Time Frame 3-5   Short Term Goal #1 Pt will improve activity tolerance to G for min 30 min treatment sessions for increase engagement in functional tasks   Short Term Goal #2 Pt will complete bed mobility at a mod I level w/ G balance/safety demonstrated to decrease caregiver assistance required   Short Term Goal  Pt will complete LB dressing/self care w/ mod I using adaptive device and DME as needed   LTG Time Frame 7-10   Long Term Goal #1 Pt will complete toileting w/ mod I w/ G hygiene/thoroughness using DME as needed   Long Term Goal #2 Pt will improve functional transfers to mod I on/off all surfaces using DME as needed w/ G balance/safety   Long Term Goal Pt will improve functional mobility during ADL/IADL/leisure tasks to mod I using DME as needed w/ G balance/safety   Plan   Treatment Interventions ADL  retraining;Functional transfer training;Endurance training;Patient/family training;Equipment evaluation/education;Compensatory technique education;Continued evaluation;Energy conservation;Activityengagement   Goal Expiration Date 03/28/24   OT Treatment Day 0   OT Frequency 3-5x/wk   Discharge Recommendation   Rehab Resource Intensity Level, OT No post-acute rehabilitation needs   Additional Comments  The patient's raw score on the AM-PAC Daily Activity Inpatient Short Form is 21 . A raw score of greater than or equal to 19 suggests the patient may benefit from discharge to home. Please refer to the recommendation of the Occupational Therapist for safe discharge planning.   AM-PAC Daily Activity Inpatient   Lower Body Dressing 3   Bathing 3   Toileting 3   Upper Body Dressing 4   Grooming 4   Eating 4   Daily Activity Raw Score 21   Daily Activity Standardized Score (Calc for Raw Score >=11) 44.27   AM-PAC Applied Cognition Inpatient   Following a Speech/Presentation 4   Understanding Ordinary Conversation 4   Taking Medications 4   Remembering Where Things Are Placed or Put Away 4   Remembering List of 4-5 Errands 4   Taking Care of Complicated Tasks 4   Applied Cognition Raw Score 24   Applied Cognition Standardized Score 62.21   Additional Treatment Session   Start Time 1350   End Time 1407   Treatment Assessment Pt seen for OT treatment session focusing on ADLs/IADLs, functional mobility, functional standing tolerance, functional transfers, patient education, continued evaluation. Pt alert and cooperative throughout session. Pt with good sitting balance and fair - dynamic standing balance. Pt tolerated treatment well. Pt completed functional mobility from room to bathroom with supervision and use of RW for stability. Pt completed toilet transfer with supervision and use of RW and grab bars for stability. Pt completed functional mobility functional household distances with RW. Pt completed don/doff of socks  seated EOB with supervision for RLE and modA for LLE. Pt educated on LB dressing, bathing, car transfer, and LE management for independence at home during treatment today. Pt reports 4/10 pain and no dizziness. Pt's vitals include: stable during treatment session.     Pt ended session seated EOB, nursing notified. Call bell and phone within reach. All needs met and pt reports no further questions at this time. Continue to recommend no post acute rehabilitation needs when medically cleared. OT will continue to follow pt on caseload.   Additional Treatment Day 1   End of Consult   Education Provided Yes;Family or social support of family present for education by provider   Patient Position at End of Consult Seated edge of bed;All needs within reach   Nurse Communication Nurse aware of consult   End of Consult Comments Pt seated EOB at end of session. Call bell and phone within reach. All needs met and pt reports no further questions for OT at this time.   Patricia Mancini, OT

## 2024-03-21 NOTE — ANESTHESIA POSTPROCEDURE EVALUATION
Post-Op Assessment Note    CV Status:  Stable  Pain Score: 0    Pain management: adequate       Mental Status:  Alert and awake   Hydration Status:  Euvolemic   PONV Controlled:  Controlled   Airway Patency:  Patent  Airway: intubated     Post Op Vitals Reviewed: Yes    No anethesia notable event occurred.    Staff: CRNA               BP   127/60   Temp   98.7   Pulse  94   Resp   18   SpO2   100%

## 2024-03-21 NOTE — ANESTHESIA PREPROCEDURE EVALUATION
"Procedure:  ARTHROPLASTY HIP TOTAL ANTERIOR,NAVIGATED (Left: Hip)    Relevant Problems   No relevant active problems      Lab Results   Component Value Date    WBC 6.89 02/22/2024    HGB 14.3 02/22/2024    HCT 42.3 02/22/2024    MCV 85 02/22/2024     02/22/2024     Lab Results   Component Value Date    K 4.8 02/22/2024    CO2 26 02/22/2024     02/22/2024    BUN 22 02/22/2024    CREATININE 0.78 02/22/2024     Lab Results   Component Value Date    INR 1.11 02/22/2024    PROTIME 14.2 02/22/2024     Lab Results   Component Value Date    PTT 29 02/22/2024       No results found for: \"GLUCOSE\"    Lab Results   Component Value Date    HGBA1C 5.1 02/22/2024       Type and Screen:  O    Physical Exam    Airway    Mallampati score: I  TM Distance: >3 FB  Neck ROM: full     Dental   No notable dental hx     Cardiovascular      Pulmonary      Other Findings  post-pubertal.      Anesthesia Plan  ASA Score- 1     Anesthesia Type- spinal with ASA Monitors.         Additional Monitors:     Airway Plan:            Plan Factors-Exercise tolerance (METS): >4 METS.    Chart reviewed.   Existing labs reviewed. Patient summary reviewed.                  Induction- intravenous.    Postoperative Plan- Plan for postoperative opioid use.     Informed Consent-   I personally reviewed this patient with the CRNA. Discussed and agreed on the Anesthesia Plan with the CRNA..                "

## 2024-03-21 NOTE — PHYSICAL THERAPY NOTE
PT EVALUATION and TREATMENT      Pt. Name: Nneka Campos  Pt. Age: 38 y.o.  MRN: [id number]  LENGTH OF STAY: 0    Patient Active Problem List   Diagnosis    Primary osteoarthritis of left hip       Admitting Diagnoses:   Left hip pain [M25.552]  Inflammatory arthropathy [M19.90]  Primary osteoarthritis of one hip, left [M16.12]    Past Medical History:   Diagnosis Date    Heart murmur        Past Surgical History:   Procedure Laterality Date    OVARIAN CYST REMOVAL  11/2023    WISDOM TOOTH EXTRACTION         Imaging Studies:  XR hip/pelv 1 vw left if performed    (Results Pending)   XR pelvis ap only 1 or 2 vw    (Results Pending)           03/21/24 1301   PT Last Visit   PT Visit Date 03/21/24   Note Type   Note type Evaluation   Pain Assessment   Pain Assessment Tool 0-10   Pain Score 4   Pain Location/Orientation Orientation: Left;Location: Hip   Hospital Pain Intervention(s) Repositioned;Ambulation/increased activity;Elevated;Emotional support;Rest   Restrictions/Precautions   Weight Bearing Precautions Per Order Yes   LLE Weight Bearing Per Order WBAT   Other Precautions WBS;THR;Multiple lines;Fall Risk;Pain   Home Living   Type of Home House   Home Layout 1/2 bath on main level;Two level;Able to live on main level with bedroom/bathroom;Stairs to enter without rails  (1 OLIVER)   Bathroom Shower/Tub Walk-in shower   Bathroom Toilet Standard   Bathroom Equipment Shower chair;Toilet raiser   Bathroom Accessibility Accessible   Home Equipment Walker;Cane;Crutches;Quad cane   Additional Comments (+)    Prior Function   Level of [place] Independent with ADLs;Independent with functional mobility;Independent with IADLS   Lives With Spouse  (2 kdis, 2 dogs)   Receives Help From Family   IADLs Independent with driving;Independent with meal prep;Independent with medication management   Falls in the last 6 months 0   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   RUE Assessment   RUE Assessment X  (see OT note)   LUE Assessment   LUE Assessment X  (see OT note)   RLE Assessment   RLE Assessment WFL   LLE Assessment   LLE Assessment X  (did not assess hip due to post op, able to move knee and ankle through normal ROM)   Light Touch   RLE Light Touch Grossly intact   LLE Light Touch Grossly intact   Bed Mobility   Supine to Sit 5  Supervision   Additional items Increased time required;Verbal cues   Sit to Supine Unable to assess   Additional Comments cues for RW safety. pt left EOB   Transfers   Sit to Stand 5  Supervision   Additional items Increased time required;Verbal cues  (RW)   Stand to Sit 5  Supervision   Additional items Increased time required;Verbal cues  (RW)   Ambulation/Elevation   Gait pattern Improper Weight shift;Decreased L stance   Gait Assistance 5  Supervision   Additional items Verbal cues   Assistive Device Rolling walker   Distance 40'x2, 200'x1 (treatment)   Stair Management Assistance 5  Supervision   Additional items Verbal cues;Increased time required   Stair Management Technique No rails;Step to pattern;Foreward;With walker   Number of Stairs 2   Ambulation/Elevation Additional Comments cues for LE sequencing. VITALS BP supine 154/84, seated /84, standing /89, slight lightheadedness when in standing, but diminished after a few minutes.   Balance   Static Sitting Normal   Dynamic Sitting Good   Static Standing Fair +   Dynamic Standing Fair -   Ambulatory Fair -   Activity Tolerance   Activity Tolerance Patient tolerated treatment well   Medical Staff Made Aware OT Patricia RN Kelli   Nurse Made Aware yes   Assessment   Prognosis Excellent   Problem List  Decreased strength;Decreased range of motion;Impaired balance;Decreased endurance;Decreased mobility;Pain;Orthopedic restrictions   Assessment Pt is a 38 y.o. female who presented to [facility] on 3/21/2024 s/p L CINDI, anterior approach done by Dr. Jackson. Precautions include WBAT. Pt  has a past medical history of Heart murmur.. Pt greeted supine for PT evaluation on 03/21/24. Pt referred to PT for functional mobility evaluation & D/C planning w/ orders of activity beginning POD #0 and activity as tolerated. PTA, pt reports being I w/o AD and I w/ IADLs. Personal factors affecting pt at time of IE include: lives in 1 story house and stairs to enter home. Please find objective findings from PT assessment regarding body systems outlined above with impairments and limitations including weakness, decreased ROM, impaired balance, decreased endurance, pain, decreased activity tolerance, decreased functional mobility tolerance, fall risk, and orthopedic restrictions.  Please see flow sheet above for objective findings and level of assistance required for safe completion of functional tasks. Pt was able to perform supine to sit with S and use of bedrails. Pt able to perform sit<>stand with RW and S. Pt ambulated 40'x2 to the bathroom with S and RW. Pt needed cues for proper RW technique. Pt was able to perform 2 steps with RW and S. Vitals taken t/o session, please see flowsheet for objective data. Pt needed cues for LE sequencing during stairs, but had good carryover. Please see treatment note for additional functional mobility following evaluation. Pt demonstrated dec endurance and tolerance to activity. Denies reports of dizziness or SOB t/o session. Patient was left  seated EOB with   with call bell and all needs within reach. Pt was educated on fall precautions and reinforced w/ good understanding. Based on pt presentation and impaired function, pt would benefit from level III, (minimal resource intensity) at D/C.  From PT/mobility standpoint, pt would benefit from OPPT to address deficits as defined above and maximize level of independence and return pt to PLOF. HEP given and reviewed with patient, no questions at this time. CM to follow. Nsg staff to continue to mobilized pt (OOB in chair for all meals & ambulate in room/unit) as tolerated to prevent further decline in function. Nsg notified. Co-eval performed with OT to complete this evaluation for the pts best interest given pts medical complexity and functional level.   Barriers to Discharge None   Goals   Patient Goals to go home   STG Expiration Date 03/28/24   Short Term Goal #1 1).  Pt will perform bed mobility with Adriane demonstrating appropriate technique 100% of the time in order to improve function.2)  Perform all transfers with Adriane demonstrating safe and appropriate technique 100% of the time in order to improve ability to negotiate safely in home environment.3) Amb with least restrictive AD > 200'x1 with Adriane in order to demonstrate ability to negotiate in home environment.4)  Improve overall strength and balance 1/2 grade in order to optimize ability to perform functional tasks and reduce fall risk.5) Increase activity tolerance to 45 minutes in order to improve endurance to functional tasks.6)  Negotiate stairs using most appropriate technique and Adriane in order to be able to negotiate safely in home environment. 7) PT for ongoing patient and family/caregiver education, DME needs and d/c planning in order to promote highest level of function in least restrictive environment.   Plan   Treatment/Interventions Therapeutic exercise;Elevations;LE strengthening/ROM;Functional transfer training;Bed mobility;Gait training;Spoke to nursing;OT;Family   PT Frequency Twice a day  (prn)   Discharge Recommendation   Rehab Resource Intensity Level, PT III (Minimum Resource Intensity)   Equipment Recommended Walker  (rn)   Additional Comments The patient's AM-PAC Basic  Mobility Inpatient Short Form Raw Score is 22. A Raw score of greater than 16 suggests the patient may benefit from discharge to home. Please also refer to the recommendation of the Physical Therapist for safe discharge planning.   AM-PAC Basic Mobility Inpatient   Turning in Flat Bed Without Bedrails 4   Lying on Back to Sitting on Edge of Flat Bed Without Bedrails 4   Moving Bed to Chair 4   Standing Up From Chair Using Arms 4   Walk in Room 3   Climb 3-5 Stairs With Railing 3   Basic Mobility Inpatient Raw Score 22   Basic Mobility Standardized Score 47.4   [facility] Highest Level Of Mobility   -HLM Goal 7: Walk 25 feet or more   -HLM Achieved 7: Walk 25 feet or more   Additional Treatment Session   Start Time 1250   End Time 1301   Treatment Assessment Pt was seen for additional functional mobility following evalatuation. Pt was able to ambulate around the unit 200'x1 with RW and S. No dizziness or LOB t/o session. Pt noted feeling less pain in hip during ambulation than when supine. Pt needed cues for RW safety.   Equipment Use RW   Additional Treatment Day 1   End of Consult   Patient Position at End of Consult Seated edge of bed;All needs within reach   End of Consult Comments Pt stable, left at EOB with  in room. RN updated/aware   Hx/personal factors: mutliple lines, use of AD, pain, WB restrictions, and fall risk  Examination: dec mobility, dec balance, dec endurance, dec amb, risk for falls, pain, assessed body system, balance, endurance, amb, D/C disposition & fall risk, WB restrictions, impairements in locomotion, musculoskeletal, balance, endurance, posture, coordination  Clinical: unpredictable (ongoing medical status, risk for falls, POD #0, and pain mgt)  Complexity: moderate     Bryanna Robert, PT

## 2024-03-21 NOTE — INTERVAL H&P NOTE
H&P reviewed. After examining the patient I find no changes in the patients condition since the H&P had been written. Plan for left CINDI.

## 2024-03-22 ENCOUNTER — TELEPHONE (OUTPATIENT)
Dept: OBGYN CLINIC | Facility: HOSPITAL | Age: 39
End: 2024-03-22

## 2024-03-22 LAB — RHODAMINE-AURAMINE STN SPEC: NORMAL

## 2024-03-22 NOTE — TELEPHONE ENCOUNTER
"Patient contacted for a postoperative follow up assessment. Patient reports doing  \"wonderful\" . Patient states current pain level of a  0/10  when sitting and 1/10 when walking with RW. Patient states they have minimal pain and it is relieved with medication regimen. Patient reports slight swelling and dressing is clean, dry and intact. Patient is icing the site regularly.     We reviewed patients AVS medication list. Patient is taking Tylenol 1000mg every 8 hours, celebrex 200mg BID, doxycyline 100mg 100mg BID for 14 days, Oxycodone 5mg PRN (patient does not feel as though it is needed at this time- informed patient to take PRN for pain), ASA 81mg BID, senokot 8.6-50mg daily. Patient has not yet had a BM but is passing gas. Encouraged patient to increase fluid and fiber intake, and incorporate miralax if needed.     Patient denies nausea, vomiting, abdominal pain, chest pain, shortness of breath, fever, dizziness and calf pain. Patient confirmed post-op appointment with PT 03/24 surgeon on 04/05. Patient does not have any other questions or concerns at this time. Pt was encouraged to call with any questions, concerns or issues.    "

## 2024-03-23 ENCOUNTER — TELEPHONE (OUTPATIENT)
Dept: OBGYN CLINIC | Facility: HOSPITAL | Age: 39
End: 2024-03-23

## 2024-03-23 NOTE — TELEPHONE ENCOUNTER
Caller: Patient    Doctor: Manuel    Reason for call: Patient had sx 3/21/24 and woke up today feeling very lightheaded and nauseous. Sent Tiger Text to Dr. Jameson on call doctor to call her back.    Call back#: [phone]

## 2024-03-24 LAB
BACTERIA SPEC ANAEROBE CULT: NO GROWTH
BACTERIA TISS AEROBE CULT: NO GROWTH
GRAM STN SPEC: NORMAL

## 2024-03-25 LAB — FUNGUS SPEC CULT: NORMAL

## 2024-03-26 LAB
MYCOBACTERIUM SPEC CULT: NORMAL
RHODAMINE-AURAMINE STN SPEC: NORMAL

## 2024-03-27 ENCOUNTER — OFFICE VISIT (OUTPATIENT)
Dept: PHYSICAL THERAPY | Facility: CLINIC | Age: 39
End: 2024-03-27
Payer: COMMERCIAL

## 2024-03-27 DIAGNOSIS — M16.12 PRIMARY OSTEOARTHRITIS OF ONE HIP, LEFT: ICD-10-CM

## 2024-03-27 DIAGNOSIS — M19.90 INFLAMMATORY ARTHROPATHY: ICD-10-CM

## 2024-03-27 DIAGNOSIS — Z96.642 HISTORY OF TOTAL LEFT HIP REPLACEMENT: Primary | ICD-10-CM

## 2024-03-27 PROCEDURE — 97110 THERAPEUTIC EXERCISES: CPT | Performed by: PHYSICAL THERAPIST

## 2024-03-27 PROCEDURE — 97161 PT EVAL LOW COMPLEX 20 MIN: CPT | Performed by: PHYSICAL THERAPIST

## 2024-03-27 NOTE — PROGRESS NOTES
PT Re-Evaluation     Today's date: 3/27/2024  Patient name: Nneka Campos  : 1985  MRN: [id number]  Referring provider: Fanta Murcia PA-C  Dx:   Encounter Diagnosis     ICD-10-CM    1. History of total left hip replacement  Z96.642       2. Inflammatory arthropathy  M19.90 PT plan of care cert/re-cert      3. Primary osteoarthritis of one hip, left  M16.12 PT plan of care cert/re-cert          Start Time: 930  Stop Time: 1015  Total time in clinic (min): 45 minutes    Assessment  Assessment details: Pt is a 38 y.o. female who presents to outpatient PT post-op CINDI 3/21/24 with incisional pain, decreased rom, decreased strength and decreased functional mobility. She will benefit from skilled PT to address these deficits in order to achieve her goals and maximize her functional mobility.     Goals:  Pt will be independent with HEP upon discharge.  Pt will improve Lt hip rom to WNL and painfree.  Pt will improve Lt hip strength to 5/5 to walk without difficulty.  Pt will be able to bike for exercise without pain.  Pt will be able to catch ground ball at softball practice.      Plan  Planned therapy interventions: abdominal trunk stabilization, manual therapy, massage, strengthening, stretching, therapeutic activities, therapeutic exercise, therapeutic training, transfer training, home exercise program, IADL retraining and gait training  Frequency: 2x week  Duration in weeks: 8        Subjective Evaluation    History of Present Illness  Mechanism of injury: Pt reports that she will be undergoing anterior CINDI on 3/21/24. Reports that she has been having pain for approx 4 years but this has been progresivly getting worse. Reports disturbances at night from pain. 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      Pt reports she used a walker for one day, cane for one day, and now walking without an AD.    Patient Goals  Patient goals  for therapy: decreased pain, increased motion, increased strength, independence with ADLs/IADLs, return to sport/leisure activities and return to work    Pain  Current pain ratin  At worst pain rating: 3    Objective    L hip:    ROM:    Flex: 95 degrees (tightness at end range)  Abd: 30 degrees (tightness at end range)  IR: NT  ER: NT      Strength:    Flex: 3+/5  Abd: 3+/5  Ir: NT  Er: NT  Ext:  3+/5 (tested supine in flexion)         Precautions: Lt anterior approach CINDI 3/21/24      Manuals 3/27            Check feet with left big toe                                                    Neuro Re-Ed                                                                                                        Ther Ex             SLR HEP            Hip ABD HEP            Bridge w/ band HEP            Clamshells HEP            Side stepping HEP            SL Heel raises HEP            Gastroc stretch HEP            Hamstring stretch HEP            Ther Activity             Bike             Squats To chair HEP            Lunges             Gait Training                                       Modalities

## 2024-04-01 ENCOUNTER — OFFICE VISIT (OUTPATIENT)
Dept: PHYSICAL THERAPY | Facility: CLINIC | Age: 39
End: 2024-04-01
Payer: COMMERCIAL

## 2024-04-01 DIAGNOSIS — Z96.642 HISTORY OF TOTAL LEFT HIP REPLACEMENT: Primary | ICD-10-CM

## 2024-04-01 DIAGNOSIS — M19.90 INFLAMMATORY ARTHROPATHY: ICD-10-CM

## 2024-04-01 LAB — FUNGUS SPEC CULT: NORMAL

## 2024-04-01 PROCEDURE — 88304 TISSUE EXAM BY PATHOLOGIST: CPT | Performed by: STUDENT IN AN ORGANIZED HEALTH CARE EDUCATION/TRAINING PROGRAM

## 2024-04-01 PROCEDURE — 97140 MANUAL THERAPY 1/> REGIONS: CPT | Performed by: PHYSICAL THERAPIST

## 2024-04-01 PROCEDURE — 88311 DECALCIFY TISSUE: CPT | Performed by: STUDENT IN AN ORGANIZED HEALTH CARE EDUCATION/TRAINING PROGRAM

## 2024-04-01 PROCEDURE — 97110 THERAPEUTIC EXERCISES: CPT | Performed by: PHYSICAL THERAPIST

## 2024-04-01 NOTE — PROGRESS NOTES
Daily Note     Today's date: 2024  Patient name: Nneka Campos  : 1985  MRN: [id number]  Referring provider: Fanta Murcia PA-C  Dx:   Encounter Diagnosis     ICD-10-CM    1. History of total left hip replacement  Z96.642       2. Inflammatory arthropathy  M19.90                      Subjective: Pt reports she      Objective: See treatment diary below      Assessment: Pt is tolerating strengthening and prom very well. Hip flexion is end range is around 100 degrees. Pt has difficulty with sitting into hip with squatting. Box or chair as well as wall squats help improve form and will continue to progress as able.      Plan: Continue per plan of care.      Precautions: Lt anterior approach CINDI 3/21/24      Manuals 3/27 4/1           Check feet with left big toe             [place] tail to ITB FH FH           Lt Hip ROM  FH                        Neuro Re-Ed                                                                                                        Ther Ex             SLR HEP            Hip ABD HEP            Bridge w/ band HEP            Clamshells HEP            Side stepping HEP 5 laps blue           SL Heel raises HEP            Gastroc stretch HEP            Hamstring stretch HEP            Wall squats  20x           SL Bridge  2x15 Lt           Side Plank Clams  2x10ea           Ther Activity             Bike  10' L3           Squats To chair HEP 2x10           Treadmill             Lunge sliders 3ways  2x5           Lateral step downs  2x8 8''           Gait Training                                       Modalities

## 2024-04-02 LAB
MYCOBACTERIUM SPEC CULT: NORMAL
RHODAMINE-AURAMINE STN SPEC: NORMAL

## 2024-04-03 ENCOUNTER — OFFICE VISIT (OUTPATIENT)
Dept: PHYSICAL THERAPY | Facility: CLINIC | Age: 39
End: 2024-04-03
Payer: COMMERCIAL

## 2024-04-03 DIAGNOSIS — M19.90 INFLAMMATORY ARTHROPATHY: ICD-10-CM

## 2024-04-03 DIAGNOSIS — Z96.642 HISTORY OF TOTAL LEFT HIP REPLACEMENT: Primary | ICD-10-CM

## 2024-04-03 PROCEDURE — 97140 MANUAL THERAPY 1/> REGIONS: CPT | Performed by: PHYSICAL THERAPIST

## 2024-04-03 PROCEDURE — 97110 THERAPEUTIC EXERCISES: CPT | Performed by: PHYSICAL THERAPIST

## 2024-04-03 NOTE — PROGRESS NOTES
Daily Note     Today's date: 4/3/2024  Patient name: Nneka Campos  : 1985  MRN: [id number]  Referring provider: Fanta Murcia PA-C  Dx:   Encounter Diagnosis     ICD-10-CM    1. History of total left hip replacement  Z96.642       2. Inflammatory arthropathy  M19.90                      Subjective: Pt reports she was alittle sore in her adductor again last session. Pt reports completing [place] for 30 minutes now.      Objective: See treatment diary below      Assessment: Pt tolerating manual well still some pain at end range hip flexion. Pt tolerating all strengthening exercises well, some genu valgus and femeral IR with single leg strengthening. Verbal and tactile cues improve mechanics. Attempted 20'' step up which was painful at rep 2 so deferred any other reps. Finished with SLR, Hip ABD, and SL bridge at table without pain. Will continue to progress as able. Educated to not click in for peleton yet.      Plan: Continue per plan of care.      Precautions: Lt anterior approach CINDI 3/21/24      Manuals 3/27 4/1 4/3          Check feet with left big toe             [place] tail to ITB FH FH           Lt Hip ROM  FH FH                       Neuro Re-Ed                                                                                                        Ther Ex             SLR HEP  2x10          Hip ABD HEP  2x10          Bridge w/ band HEP            Clamshells HEP            Side stepping HEP 5 laps blue 5 laps blue          SL Heel raises HEP            Gastroc stretch HEP            Hamstring stretch HEP            Wall squats  20x           SL Bridge  2x15 Lt 2x15 Lt          Side Plank Clams  2x10ea           Ther Activity             Bike  10' L3 10' L8          Squats To chair HEP 2x10 2x10 15#          Treadmill             Lunge sliders 3ways  2x5 3x5          Step ups             Lateral step downs  2x8 8'' 2x8 8''          Kickstand RDL   2x10 10#          Gait Training                                                     Modalities

## 2024-04-04 ENCOUNTER — APPOINTMENT (OUTPATIENT)
Dept: PHYSICAL THERAPY | Facility: CLINIC | Age: 39
End: 2024-04-04
Payer: COMMERCIAL

## 2024-04-05 ENCOUNTER — OFFICE VISIT (OUTPATIENT)
Dept: OBGYN CLINIC | Facility: CLINIC | Age: 39
End: 2024-04-05

## 2024-04-05 ENCOUNTER — APPOINTMENT (OUTPATIENT)
Dept: RADIOLOGY | Facility: CLINIC | Age: 39
End: 2024-04-05
Payer: COMMERCIAL

## 2024-04-05 VITALS
BODY MASS INDEX: 21.44 KG/M2 | DIASTOLIC BLOOD PRESSURE: 83 MMHG | HEIGHT: 63 IN | HEART RATE: 57 BPM | SYSTOLIC BLOOD PRESSURE: 133 MMHG | WEIGHT: 121 LBS

## 2024-04-05 DIAGNOSIS — Z47.1 AFTERCARE FOLLOWING LEFT HIP JOINT REPLACEMENT SURGERY: ICD-10-CM

## 2024-04-05 DIAGNOSIS — Z96.642 AFTERCARE FOLLOWING LEFT HIP JOINT REPLACEMENT SURGERY: Primary | ICD-10-CM

## 2024-04-05 DIAGNOSIS — Z47.1 AFTERCARE FOLLOWING LEFT HIP JOINT REPLACEMENT SURGERY: Primary | ICD-10-CM

## 2024-04-05 DIAGNOSIS — Z96.642 AFTERCARE FOLLOWING LEFT HIP JOINT REPLACEMENT SURGERY: ICD-10-CM

## 2024-04-05 PROCEDURE — 99024 POSTOP FOLLOW-UP VISIT: CPT | Performed by: STUDENT IN AN ORGANIZED HEALTH CARE EDUCATION/TRAINING PROGRAM

## 2024-04-05 PROCEDURE — 73502 X-RAY EXAM HIP UNI 2-3 VIEWS: CPT

## 2024-04-05 NOTE — PROGRESS NOTES
Subjective: Patient seen and examined. Progressing very well. She has some soreness in the IT band. Incision without drainage, prineo dressing intact. Denies fevers or chills.  She has been back to the gym and she is in PT. She has been riding her Peloton since the first week and has significantly less pain than pre-op.    Physical Exam:  Incision:  CDI, prineo dressing intact  ROM: full wo pain  5/5 IP/Q/HS/TA/GS, 2+ DP/PT, SILT DP/SP/S/S/TN    XR Left hip: s/p press-fit total hip arthroplasty, components in good position. No fracture or dislocation       Assessment/Plan:  37 y/o female doing exceptionally well 2 weeks s/p Left CINDI from 3/21/24.    - continue multi-modal pain control   - Weight bearing status: WBAT LLE  - DVT ppx: aspirin 81mg twice daily  - Incision care: May shower, do not scrub or submerge incision.  - I/O pathology: benign synovial chondromatosis   - PT/OT  - F/U 4 weeks      Scribe Attestation      I,:  Fanta Murcia PA-C am acting as a scribe while in the presence of the attending physician.:       I,:  Gaudencio Jackson, DO personally performed the services described in this documentation    as scribed in my presence.:

## 2024-04-08 LAB — FUNGUS SPEC CULT: NORMAL

## 2024-04-09 LAB
MYCOBACTERIUM SPEC CULT: NORMAL
RHODAMINE-AURAMINE STN SPEC: NORMAL

## 2024-04-10 ENCOUNTER — APPOINTMENT (OUTPATIENT)
Dept: PHYSICAL THERAPY | Facility: CLINIC | Age: 39
End: 2024-04-10
Payer: COMMERCIAL

## 2024-04-17 ENCOUNTER — APPOINTMENT (OUTPATIENT)
Dept: PHYSICAL THERAPY | Facility: CLINIC | Age: 39
End: 2024-04-17
Payer: COMMERCIAL

## 2024-04-17 DIAGNOSIS — M16.12 PRIMARY OSTEOARTHRITIS OF ONE HIP, LEFT: ICD-10-CM

## 2024-04-17 RX ORDER — CELECOXIB 200 MG/1
200 CAPSULE ORAL 2 TIMES DAILY
Qty: 60 CAPSULE | Refills: 0 | Status: SHIPPED | OUTPATIENT
Start: 2024-04-17

## 2024-04-22 LAB — FUNGUS SPEC CULT: NORMAL

## 2024-04-23 LAB
MYCOBACTERIUM SPEC CULT: NORMAL
RHODAMINE-AURAMINE STN SPEC: NORMAL

## 2024-04-24 ENCOUNTER — APPOINTMENT (OUTPATIENT)
Dept: PHYSICAL THERAPY | Facility: CLINIC | Age: 39
End: 2024-04-24
Payer: COMMERCIAL

## 2024-05-03 ENCOUNTER — OFFICE VISIT (OUTPATIENT)
Dept: OBGYN CLINIC | Facility: CLINIC | Age: 39
End: 2024-05-03

## 2024-05-03 VITALS
BODY MASS INDEX: 21.44 KG/M2 | WEIGHT: 121 LBS | SYSTOLIC BLOOD PRESSURE: 129 MMHG | HEART RATE: 71 BPM | HEIGHT: 63 IN | DIASTOLIC BLOOD PRESSURE: 82 MMHG

## 2024-05-03 DIAGNOSIS — Z47.1 AFTERCARE FOLLOWING LEFT HIP JOINT REPLACEMENT SURGERY: Primary | ICD-10-CM

## 2024-05-03 DIAGNOSIS — Z96.642 AFTERCARE FOLLOWING LEFT HIP JOINT REPLACEMENT SURGERY: Primary | ICD-10-CM

## 2024-05-03 PROCEDURE — 99024 POSTOP FOLLOW-UP VISIT: CPT | Performed by: STUDENT IN AN ORGANIZED HEALTH CARE EDUCATION/TRAINING PROGRAM

## 2024-05-03 RX ORDER — AMOXICILLIN 500 MG/1
CAPSULE ORAL
Qty: 4 CAPSULE | Refills: 5 | Status: SHIPPED | OUTPATIENT
Start: 2024-05-03 | End: 2024-06-03

## 2024-05-03 NOTE — PROGRESS NOTES
Subjective:Patient seen and examined. Pain controlled. She has returned to work and all normal activities without issue.  She does report occasional muscle spasms. Incision without drainage. Denies fevers or chills    Physical Exam:  Incision: c/d/I, well healed without erythema or induration  ROM: full without pain  5/5 IP/Q/HS/TA/GS, 2+ DP/PT, SILT DP/SP/S/S/TN    No new imaging    Assessment/Plan:  38 year old female 6 weeks s/p left total hip arthroplasty for erosive changes with synovial chondromatosis.  The patient is doing exceptionally well.    - continue multi-modal pain control   - Weight bearing status: WBAT LLE  - DVT ppx course complete  - Incision care: may shower and submerge incision  - Continue home exercise program  - F/U 6 weeks for 3 month check.    Scribe Attestation      I,:   am acting as a scribe while in the presence of the attending physician.:       I,:   personally performed the services described in this documentation    as scribed in my presence.:

## 2024-05-07 LAB
MYCOBACTERIUM SPEC CULT: NORMAL
RHODAMINE-AURAMINE STN SPEC: NORMAL

## 2024-07-02 ENCOUNTER — OFFICE VISIT (OUTPATIENT)
Dept: OBGYN CLINIC | Facility: MEDICAL CENTER | Age: 39
End: 2024-07-02

## 2024-07-02 VITALS
WEIGHT: 121 LBS | BODY MASS INDEX: 21.44 KG/M2 | SYSTOLIC BLOOD PRESSURE: 106 MMHG | HEIGHT: 63 IN | DIASTOLIC BLOOD PRESSURE: 67 MMHG | HEART RATE: 69 BPM

## 2024-07-02 DIAGNOSIS — Z47.1 AFTERCARE FOLLOWING LEFT HIP JOINT REPLACEMENT SURGERY: ICD-10-CM

## 2024-07-02 DIAGNOSIS — M25.561 ACUTE PAIN OF RIGHT KNEE: ICD-10-CM

## 2024-07-02 DIAGNOSIS — Z96.642 AFTERCARE FOLLOWING LEFT HIP JOINT REPLACEMENT SURGERY: ICD-10-CM

## 2024-07-02 DIAGNOSIS — M70.50 PES ANSERINE BURSITIS: Primary | ICD-10-CM

## 2024-07-02 PROCEDURE — 99024 POSTOP FOLLOW-UP VISIT: CPT | Performed by: STUDENT IN AN ORGANIZED HEALTH CARE EDUCATION/TRAINING PROGRAM

## 2024-07-02 NOTE — PROGRESS NOTES
Knee New Office Note    Assessment:     1. Aftercare following left hip joint replacement surgery    2. Acute pain of right knee    3. Pes anserine bursitis        Plan:     Problem List Items Addressed This Visit    None  Visit Diagnoses       Aftercare following left hip joint replacement surgery    -  Primary    Acute pain of right knee        Pes anserine bursitis               Findings today are consistent with right knee pes anserine bursitis and 3 months s/p left total hip arthroplasty for erosive changes with synovial chondromatosis performed on 03/21/2024. Reviewed hamstring exercises and stretching activities for her right knee. She may continue activities as tolerated. Follow up in 9 months for annual check. New x-rays upon arrival.    Subjective:     Patient ID: Nneka Campos is a 39 y.o. female.  Chief Complaint:  HPI:  39 y.o. female presents to the office 3 months s/p left total hip arthroplasty for erosive changes with synovial chondromatosis performed on 03/21/2024. She is doing well overall. She has returned to all activities without restrictions. She does note some right knee pain over the past few weeks without known injury. She is experiencing anteromedial right knee pain made worse with activities such as kneeling, deadlifting, and stairs. She also notes intermittent swelling.    Allergy:  Allergies   Allergen Reactions    Dust Mite Extract Allergic Rhinitis    Tree Extract Rash     Medications:  all current active meds have been reviewed  Past Medical History:  Past Medical History:   Diagnosis Date    Heart murmur      Past Surgical History:  Past Surgical History:   Procedure Laterality Date    OVARIAN CYST REMOVAL  11/2023    NM ARTHRP ACETBLR/PROX FEM PROSTC AGRFT/ALGRFT Left 3/21/2024    Procedure: ARTHROPLASTY HIP TOTAL ANTERIOR,NAVIGATED;  Surgeon: Gaudencio Jackson DO;  Location:  [facility] OR;  Service: Orthopedics    WISDOM TOOTH EXTRACTION       Family History:  History reviewed.  "No pertinent family history.  Social History:  Social History     Substance and Sexual Activity   Alcohol Use Not Currently    Comment: socially     Social History     Substance and Sexual Activity   Drug Use Never     Social History     Tobacco Use   Smoking Status Never   Smokeless Tobacco Never         ROS:  General: Per HPI  Skin: Negative, except if noted below  HEENT: Negative  Respiratory: Negative  Cardiovascular: Negative  Gastrointestinal: Negative  Urinary: Negative  Vascular: Negative  Musculoskeletal: Positive per HPI   Neurologic: Positive per HPI  Endocrine: Negative    Objective:  BP Readings from Last 1 Encounters:   07/02/24 106/67      Wt Readings from Last 1 Encounters:   07/02/24 54.9 kg (121 lb)        Respiratory:   non-labored respirations    Lymphatics:  no palpable lymph nodes    Gait:   Steady    Neurologic:   Alert and oriented times 3  Patient with normal sensation except as noted below  Deep tendon reflexes 2+ except as noted in MSK exam    Bilateral Lower Extremity:  Left Hip:    Incision: well healed  ROM: as expected without pain  5/5 IP/Q/HS/TA/GS, 2+ DP/PT, SILT DP/SP/S/S/TN    Right knee:     Inspection:  skin intact    Overall limb alignment neutral    Effusion: trace    ROM full without pain    Extensor Lag: none    Palpation: pes anserine tenderness to palpation    AP Stability at 90 deg stable    M/L stability in full extension stable    M/L stability in midflexion stable    Motor: 5/5 Q/HS/TA/GS/P    Pulses: 2+ DP / 2+ PT    SILT DP/SP/S/S/TN    Imaging:  No new imaging obtained today    BMI:   Estimated body mass index is 21.43 kg/m² as calculated from the following:    Height as of this encounter: 5' 3\" (1.6 m).    Weight as of this encounter: 54.9 kg (121 lb).  BSA:   Estimated body surface area is 1.56 meters squared as calculated from the following:    Height as of this encounter: 5' 3\" (1.6 m).    Weight as of this encounter: 54.9 kg (121 lb).           Scribe " Attestation      I,:  Jayde Rousseau am acting as a scribe while in the presence of the attending physician.:       I,:  Gaudencio Jackson, DO personally performed the services described in this documentation    as scribed in my presence.:

## 2025-04-11 ENCOUNTER — APPOINTMENT (OUTPATIENT)
Dept: RADIOLOGY | Facility: CLINIC | Age: 40
End: 2025-04-11
Attending: STUDENT IN AN ORGANIZED HEALTH CARE EDUCATION/TRAINING PROGRAM
Payer: COMMERCIAL

## 2025-04-11 ENCOUNTER — OFFICE VISIT (OUTPATIENT)
Dept: OBGYN CLINIC | Facility: CLINIC | Age: 40
End: 2025-04-11
Payer: COMMERCIAL

## 2025-04-11 VITALS — WEIGHT: 121 LBS | HEIGHT: 63 IN | BODY MASS INDEX: 21.44 KG/M2

## 2025-04-11 DIAGNOSIS — Z96.642 AFTERCARE FOLLOWING LEFT HIP JOINT REPLACEMENT SURGERY: Primary | ICD-10-CM

## 2025-04-11 DIAGNOSIS — Z47.1 AFTERCARE FOLLOWING LEFT HIP JOINT REPLACEMENT SURGERY: ICD-10-CM

## 2025-04-11 DIAGNOSIS — Z96.642 AFTERCARE FOLLOWING LEFT HIP JOINT REPLACEMENT SURGERY: ICD-10-CM

## 2025-04-11 DIAGNOSIS — Z47.1 AFTERCARE FOLLOWING LEFT HIP JOINT REPLACEMENT SURGERY: Primary | ICD-10-CM

## 2025-04-11 PROCEDURE — 99213 OFFICE O/P EST LOW 20 MIN: CPT | Performed by: STUDENT IN AN ORGANIZED HEALTH CARE EDUCATION/TRAINING PROGRAM

## 2025-04-11 PROCEDURE — 73502 X-RAY EXAM HIP UNI 2-3 VIEWS: CPT

## 2025-04-11 NOTE — PROGRESS NOTES
Hip Follow up Office Note    Assessment:     1. Aftercare following left hip joint replacement surgery        Plan:  Assessment & Plan  Aftercare following left hip joint replacement surgery  39 y.o. female doing well 1 year s/p Left Anterior CINDI from 3/21/24.  Xrays of the left hip reviewed today showing prosthesis in good position with no signs of loosening.  On exam she has some painless popping of her hip that is likely due to her rectus tendon.  She has no pain in her hip and is able to do all activities as tolerated.  We will see her back in 4 years for annual recheck with xrays, sooner as needed.    Orders:    XR hip/pelv 2-3 vws left if performed; Future      Subjective:     Patient ID: Nneka Campos is a 39 y.o. female.    Chief Complaint:  HPI:  Patient is a 39 y.o. female here today for annual post op evaluation of their left hip.  She is 1 year s/p Left CINDI, DOS: 3/21/24.  She has been doing well overall. She notes that sometimes she notices her hip is popping with certain activities and that she will get some mid back pain.  Overall though, she is able to do all activities as tolerated and her pain is significantly improved from pre-op.  She is pleased with her progress.    Allergy:  Allergies   Allergen Reactions    Dust Mite Extract Allergic Rhinitis    Tree Extract Rash     Medications:  all current active meds have been reviewed and current meds: No current facility-administered medications for this visit.  Past Medical History:  Past Medical History:   Diagnosis Date    Heart murmur      Past Surgical History:  Past Surgical History:   Procedure Laterality Date    OVARIAN CYST REMOVAL  11/2023    [place] ARTHRP ACETBLR/PROX FEM PROSTC AGRFT/ALGRFT Left 3/21/2024    Procedure: ARTHROPLASTY HIP TOTAL ANTERIOR,NAVIGATED;  Surgeon: Gaudencio Jackson DO;  Location:  [facility] OR;  Service: Orthopedics    WISDOM TOOTH EXTRACTION       Family History:  History reviewed. No pertinent family history.  Social  "History:  Social History     Substance and Sexual Activity   Alcohol Use Not Currently    Comment: socially     Social History     Substance and Sexual Activity   Drug Use Never     Social History     Tobacco Use   Smoking Status Never   Smokeless Tobacco Never           ROS:  General: Per HPI  Skin: Negative, except if noted below  HEENT: Negative  Respiratory: Negative  Cardiovascular: Negative  Gastrointestinal: Negative  Urinary: Negative  Vascular: Negative  Musculoskeletal: Positive per HPI   Neurologic: Positive per HPI  Endocrine: Negative    Objective:  BP Readings from Last 1 Encounters:   07/02/24 106/67      Wt Readings from Last 1 Encounters:   04/11/25 54.9 kg (121 lb)        Respiratory:   non-labored respirations    Lymphatics:  no palpable lymph nodes    Gait and Station:   normal    Neurologic:   Alert and oriented times 3  Patient with normal sensation except as noted below  Deep tendon reflexes 2+ except as noted in MSK exam    Bilateral Lower Extremity:  Left Hip     Inspection: skin intact, well healed surgical incision    Range of Motion: full wo pain    - log roll    - Trendelenburg sign    Motor: 5/5 Q/HS/TA/GS/P    Pulses: 2+ DP / 2+ PT    SILT DP/SP/S/S/TN    Imaging:  My interpretation XR AP pelvis/ left hip: s/p press-fit total hip arthroplasty, components in good position. No fracture or dislocation       BMI:   Estimated body mass index is 21.43 kg/m² as calculated from the following:    Height as of this encounter: 5' 3\" (1.6 m).    Weight as of this encounter: 54.9 kg (121 lb).  BSA:   Estimated body surface area is 1.56 meters squared as calculated from the following:    Height as of this encounter: 5' 3\" (1.6 m).    Weight as of this encounter: 54.9 kg (121 lb).           Scribe Attestation      I,:  Fanta Murcia PA-C am acting as a scribe while in the presence of the attending physician.:       I,:  Gaudencio Jackson, DO personally performed the services described in this " documentation    as scribed in my presence.:

## (undated) DEVICE — ADHESIVE SKIN CLOSURE SYS EXOFIN FUSION 22CM

## (undated) DEVICE — ELECTRODE BLADE E-Z CLEAN 4IN -0014A

## (undated) DEVICE — SUT STRATAFIX SPIRAL PDS PLUS 1 CTX 18 IN SXPP1A400

## (undated) DEVICE — GLOVE SRG BIOGEL 8.5

## (undated) DEVICE — SURGICAL GOWN, XL SMARTSLEEVE: Brand: CONVERTORS

## (undated) DEVICE — GLOVE SRG BIOGEL 6.5

## (undated) DEVICE — HANDPIECE SET WITH RETRACTABLE COAXIAL FAN SPRAY TIP AND SUCTION TUBE: Brand: INTERPULSE

## (undated) DEVICE — SAW BLADE OSCILLATING BRAZOL 167

## (undated) DEVICE — ARTHROSCOPY FLOOR MAT

## (undated) DEVICE — PLUMEPEN PRO 10FT

## (undated) DEVICE — DRESSING MEPILEX AG BORDER POST-OP 4 X 10 IN

## (undated) DEVICE — WEBRIL 6 IN UNSTERILE

## (undated) DEVICE — SUT VICRYL 2-0 CT-1 27 IN J259H

## (undated) DEVICE — GLOVE INDICATOR PI UNDERGLOVE SZ 8.5 BLUE

## (undated) DEVICE — CAPIT UPCHRG EMPHASYS ACETABULAR

## (undated) DEVICE — BETHLEHEM TOTAL HIP, KIT: Brand: CARDINAL HEALTH

## (undated) DEVICE — GLOVE SRG BIOGEL ORTHOPEDIC 8.5

## (undated) DEVICE — DRAPE C-ARM X-RAY

## (undated) DEVICE — POSITIONER HANA TABLE PACK

## (undated) DEVICE — GLOVE INDICATOR PI UNDERGLOVE SZ 6.5 BLUE

## (undated) DEVICE — BAG POLY CLEAR 12 X 8 X 30

## (undated) DEVICE — SYRINGE 50ML LL

## (undated) DEVICE — HOOD: Brand: T7PLUS

## (undated) DEVICE — 3M™ STERI-DRAPE™ U-DRAPE 1015: Brand: STERI-DRAPE™

## (undated) DEVICE — CAPIT HIP COP -CMNT/POR-ACTIS

## (undated) DEVICE — SUT STRATAFIX SPIRAL MONOCRYL PLUS 4-0 PS-2 45CM SXMP1B118

## (undated) DEVICE — 450 ML BOTTLE OF 0.05% CHLORHEXIDINE GLUCONATE IN 99.95% STERILE WATER FOR IRRIGATION, USP AND APPLICATOR.: Brand: IRRISEPT ANTIMICROBIAL WOUND LAVAGE

## (undated) DEVICE — SPONGE SCRUB 4 PCT CHLORHEXIDINE

## (undated) DEVICE — 6617 IOBAN II PATIENT ISOLATION DRAPE 5/BX,4BX/CS: Brand: STERI-DRAPE™ IOBAN™ 2

## (undated) DEVICE — NEEDLE 18 G X 1 1/2